# Patient Record
Sex: MALE | Race: WHITE | Employment: FULL TIME | URBAN - METROPOLITAN AREA
[De-identification: names, ages, dates, MRNs, and addresses within clinical notes are randomized per-mention and may not be internally consistent; named-entity substitution may affect disease eponyms.]

---

## 2020-07-13 ENCOUNTER — OFFICE VISIT (OUTPATIENT)
Dept: ORTHOPEDIC SURGERY | Age: 55
End: 2020-07-13
Payer: COMMERCIAL

## 2020-07-13 VITALS — BODY MASS INDEX: 29.78 KG/M2 | HEIGHT: 70 IN | WEIGHT: 208 LBS

## 2020-07-13 PROCEDURE — 99242 OFF/OP CONSLTJ NEW/EST SF 20: CPT | Performed by: ORTHOPAEDIC SURGERY

## 2020-07-13 RX ORDER — ATORVASTATIN CALCIUM 80 MG/1
80 TABLET, FILM COATED ORAL NIGHTLY
COMMUNITY
Start: 2019-10-04
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
## 2020-07-13 NOTE — LETTER
[address] [facility]   Surgery Precert & Billing Form:    DEMOGRAPHICS:                                                                                                       Patient Name:  Shala Jaquez  Patient :  1965   Patient SS#:      Patient Phone:  [phone] (home)  Alt. Patient Phone:    Patient Address:  [address] [id number]    PCP:   [facility]: Payor: Darius Abraham / Plan: [facility] - [place] PPO / Product Type: *No Product type* /     DIAGNOSIS & PROCEDURE:                                                                                      Diagnosis:   1. Traumatic complete tear of right rotator cuff, initial encounter    2.  Right shoulder injury, initial encounter      Operation: right    SURGERY  INFORMATION  Date of Surgery:   2020  Location:  [facility]   Type:    Outpatient  23 hour hold:  No  Surgeon:              Lottie Herndon MD      7/15/20     BILLING INFORMATION:                                                                                                Physician Procedure                                            CPT Codes        Right shoulder arthroscopy with rotator cuff repair and biceps tenodesis  65841, 51816                      PA, or Fellow Procedure                                      CPT Codes                     Precert information:

## 2020-07-13 NOTE — LETTER
PRE-SURGICAL PHYSICIAN ORDERS     Patient Name Leena Joseph                              1965                           Allergies Patient has no known allergies.                                                                               Pre-surgery Testing Orders:   []? Pre-surgical Anesthesia Orders Per Anesthesiologist         Preop Antibiotic Prophylaxis / DAY OF SURGERY      [x]? Cefazolin IVPB per weight base protocol  · Cefazolin 2 grams if <119.9 kg  · Cefazolin 3 grams if ?120kg (?264 lbs.)  · Pediatric(<12yo) Dosinmg/kg (maximum 2 grams)       If allergic to PCN/Cephalosporin, positive MRSA/history or ? 72  age (risk of C-difficile):         []? Vancomycin IVPB per weight base protocol  · Vancomycin 1 gm if < 80kg (<176 lbs.)  · Vancomycin 1.5 gm if ?80kg to <120kg (?176 to <264 lbs.)  · Vancomycin 2 gm if  if ?120kg (?264 lbs.)   ADDITIONAL MEDICATIONS:      [x]? Acetaminophen 500mg po 1 hour prior to procedure    []? EXPAREL 1.3%  20 cc  Subcutaneous     **DIONI AND RAMIREZ ONLY**  []? Ortho Irrigation: Bacitracin 50,999 units and Polymixin B 500,000 unites mixed in a syringe. OR to mix with 500 cc NS and use 200 cc for irrigation (FOR ALL PATIENTS WHO REQUIRED METAL IMPLANT OR GRAFT)   DVT PREVENTION:    Antithrombic wraps (Age 16 & older)  []? Thigh High  []? Knee high            []? Bilateral    []? Right     []? Left  [x]?   Knee high SHALOM Hose           Signature                                                 Date 7/15/20 2:45 PM                                              Coby Corral M.D                                                                                                 Scheduled By:  Kylee Dickerson 7/15/20   Direct Tel: [phone]                                        Direct Fax: [phone]

## 2020-07-13 NOTE — LETTER
[address] [place] [address]              [] [address] [facility]  Fax# [phone]                                                     [] Smita [facility]#[phone]                                      [x][address] [facility]#[phone]    Scheduled Date: 2020      Scheduled Time: 12pm      Time Needed: 60min     Patient Information:      Name: Rachael Lea      YOB: 1965      SSN:         Gender:  male                            Address: [address] [place] [address]   Phone Number: [phone] (home)     Insurance:  Payor: [facility] / Plan: Katrin Oreilly PPO / Product Type: *No Product type* /     Primary Care Physician:  Caio Armenta    H&P To Be Completed By: Dr. Ibrahim Lipoma Needed? [] Yes [x] No   Pacemaker/Cardiac Implant? [] Yes [x] No    Surgical Procedure: Right shoulder arthroscopy with rotator cuff repair and biceps tenodesis   CPT DACN:14018, 15112    Pre- Op Diagnosis:   1. Traumatic complete tear of right rotator cuff, initial encounter    2. Right shoulder injury, initial encounter      Diagnosis Code: U59.498V    Rep: Arthrex     Notified: [x] Yes [] No    Special Equipment: Proximal biceps kit, beach chair      [] Mini C-ARM   [] Large  C-ARM     Patient Status:   [x] Outpatient         [] Same Day Admission      [] In- Patient          []Day Admit    Anesthesia Type:    [x] General         [] MAC       [] IV Regional          [] Local          [x] ISB Block      Allergies:  No Known Allergies  Latex: [] Yes [x] No     Vitals:    20 1016   Weight: 208 lb (94.3 kg)   Height: 5' 10\" (1.778 m)                                                                  PRE-SURGICAL [facility]    Patient Name Rachael Lea     1965       Allergies Patient has no known allergies.             Pre-surgery Testing Orders:   [] Pre-surgical Anesthesia Orders Per Anesthesiologist Preop Antibiotic Prophylaxis / DAY OF SURGERY    [x] Cefazolin IVPB per weight base protocol  ? Cefazolin 2 grams if <119.9 kg  ? Cefazolin 3 grams if ?120kg (?264 lbs. )  ? Pediatric(<12yo) Dosinmg/kg (maximum 2 grams)     If allergic to PCN/Cephalosporin, positive MRSA/history or ? 72  age (risk of C-difficile):       [] Vancomycin IVPB per weight base protocol  ? Vancomycin 1 gm if < 80kg (<176 lbs. )  ? Vancomycin 1.5 gm if ?80kg to <120kg (?176 to <264 lbs. )  ? Vancomycin 2 gm if  if ?120kg (?264 lbs.)   ADDITIONAL MEDICATIONS:    [x] OFIRMEV IVPB 1gm over 15 minutes (Adjust to body weight when needed)       Administer in pre-op. []  EXPAREL 1.3%  20 cc  Subcutaneous    **[place]AST[place] AND RAMIREZ ONLY**  []  Ortho Irrigation: Bacitracin 50,999 units and Polymixin B 500,000 unites mixed in a syringe.  OR to mix with 500 cc NS and use 200 cc for irrigation (FOR ALL PATIENTS WHO REQUIRED METAL IMPLANT OR GRAFT)   DVT PREVENTION:  Antithrombic wraps (Age 16 & older)  [] Thigh High  []  Knee high            []Bilateral    [] Right     []  Left  [x]  Knee high SHALOM Hose           Signature                                                 Date 7/15/20 2:45 PM                                             Melissa Jeffery M.D                                                                                                 Scheduled By:  Chantelle Grant 7/15/20   Direct Tel: [phone]                                      Direct Fax: [phone]

## 2020-07-15 NOTE — PROGRESS NOTES
Moran Manifold    Age 53 y.o.    male    1965    MRN [id number]    7/23/2020  Arrival Time_____________  OR Time____________[address]     Procedure(s):  VIDEO ARTHROSCOPY RIGHT SHOULDER,  ROTATOR CUFF REPAIR, BICEPS TENODESIS -BLOCK-                      General    Surgeon(s):  Herbert Alarcon, MD       Phone [phone] ([place])     [facility]  Cell Work  _________________________________________________________________  _________________________________________________________________  _________________________________________________________________  _________________________________________________________________  _________________________________________________________________      PCP _____________________________ Phone_________________       H&P__________________Bringing    Chart            Epic  DOS     Called_______  EKG__________________Bringing    Chart            Epic  DOS     Called_______  LAB__________________ Bringing    Chart            Epic  DOS     Called_______  Cardiac Clearance_______Bringing    Chart            Epic      DOS       Called_______    Cardiologist________________________ Phone___________________________      ? Latter-day concerns / Waiver on Chart            PAT Communications_____________  ? Pre-op Instructions Given [place]          ______________________________  ? Directions to Surgery Center                          ______________________________  ? Transportation Home_______________      _______________________________  ?  Crutches/Walker__________________        _______________________________      ________Pre-op Orders   _______Transcribed    _______Wt.  ________Pharmacy          _______SCD  ______VTE     ______Beta Blocker  ________Consent             ________TED Loida Barbone

## 2020-07-16 NOTE — PROGRESS NOTES
Patient instructed to:  Bring picture ID, insurance card,proof of address  Dress in comfortable,loose clothing,no jewelry, no make up/or finger polish/nocontact lenses dos if appplicable  ALL Edgar Matt on operative limb must be removed prior to DOS -if applicable  Nothing to eat or drink after midnight the night before surgery   If instructed to take medicines day of surgery by PCP, take only with sips of water  If you are taking insulin or diabetic medications check with your PCP to adjust doses according to your NPO (not eating) status  Arrange for transportation to and from surgery  With a caregiver staying with you for 24 hours  --make sure you are bring appropriate clothes to fit over large operative drsg - if applicable  Bring copies of H&P and or ekg dos     If your are taking NSAIDS/ASA products/vitamins regularly confirm this with your surgeon regarding taking them preop 5 days before surgery     Notify your Surgeon if you develop any illness between now and surgery time; cough, cold, fever, sore throat, nausea, vomiting, etc.

## 2020-07-17 ENCOUNTER — OFFICE VISIT (OUTPATIENT)
Dept: PRIMARY CARE CLINIC | Age: 55
End: 2020-07-17
Payer: COMMERCIAL

## 2020-07-17 PROCEDURE — 99211 OFF/OP EST MAY X REQ PHY/QHP: CPT | Performed by: NURSE PRACTITIONER

## 2020-07-17 NOTE — PROGRESS NOTES
Missael Irizarry received a viral test for COVID-19. They were educated on isolation and quarantine as appropriate. For any symptoms, they were directed to seek care from their PCP, given contact information to establish with a doctor, directed to an urgent care or the emergency room.

## 2020-07-20 LAB — SARS-COV-2: NOT DETECTED

## 2020-07-21 ENCOUNTER — TELEPHONE (OUTPATIENT)
Dept: ORTHOPEDIC SURGERY | Age: 55
End: 2020-07-21

## 2020-07-21 NOTE — TELEPHONE ENCOUNTER
Auth: # [id number]    Date: 07/23/2020 - 10/21/2020  Type of SX:  OP  Location: [facility]  CPT: 11714 04384   DX Code: Q10.414D   Q09.91WK  SX area: RT shoulder arthroscopy with RCR  Insurance: Thuan Carias

## 2020-07-22 ENCOUNTER — ANESTHESIA EVENT (OUTPATIENT)
Dept: OPERATING ROOM | Age: 55
End: 2020-07-22
Payer: COMMERCIAL

## 2020-07-23 ENCOUNTER — ANESTHESIA (OUTPATIENT)
Dept: OPERATING ROOM | Age: 55
End: 2020-07-23
Payer: COMMERCIAL

## 2020-07-23 ENCOUNTER — HOSPITAL ENCOUNTER (OUTPATIENT)
Age: 55
Setting detail: OUTPATIENT SURGERY
Discharge: HOME | End: 2020-07-23
Attending: ORTHOPAEDIC SURGERY | Admitting: ORTHOPAEDIC SURGERY
Payer: COMMERCIAL

## 2020-07-23 VITALS
HEART RATE: 67 BPM | DIASTOLIC BLOOD PRESSURE: 75 MMHG | OXYGEN SATURATION: 94 % | BODY MASS INDEX: 29.78 KG/M2 | SYSTOLIC BLOOD PRESSURE: 122 MMHG | RESPIRATION RATE: 18 BRPM | HEIGHT: 70 IN | WEIGHT: 208 LBS | TEMPERATURE: 97 F

## 2020-07-23 VITALS
SYSTOLIC BLOOD PRESSURE: 117 MMHG | DIASTOLIC BLOOD PRESSURE: 79 MMHG | OXYGEN SATURATION: 98 % | RESPIRATION RATE: 2 BRPM

## 2020-07-23 PROCEDURE — 6360000002 HC RX W HCPCS: Performed by: NURSE ANESTHETIST, CERTIFIED REGISTERED

## 2020-07-23 PROCEDURE — 64415 NJX AA&/STRD BRCH PLXS IMG: CPT | Performed by: ANESTHESIOLOGY

## 2020-07-23 PROCEDURE — 3700000001 HC ADD 15 MINUTES (ANESTHESIA): Performed by: ORTHOPAEDIC SURGERY

## 2020-07-23 PROCEDURE — 3600000004 HC SURGERY LEVEL 4 BASE: Performed by: ORTHOPAEDIC SURGERY

## 2020-07-23 PROCEDURE — 7100000000 HC PACU RECOVERY - FIRST 15 MIN: Performed by: ORTHOPAEDIC SURGERY

## 2020-07-23 PROCEDURE — 2500000003 HC RX 250 WO HCPCS: Performed by: ANESTHESIOLOGY

## 2020-07-23 PROCEDURE — 3600000014 HC SURGERY LEVEL 4 ADDTL 15MIN: Performed by: ORTHOPAEDIC SURGERY

## 2020-07-23 PROCEDURE — 7100000010 HC PHASE II RECOVERY - FIRST 15 MIN: Performed by: ORTHOPAEDIC SURGERY

## 2020-07-23 PROCEDURE — 3700000000 HC ANESTHESIA ATTENDED CARE: Performed by: ORTHOPAEDIC SURGERY

## 2020-07-23 PROCEDURE — 2709999900 HC NON-CHARGEABLE SUPPLY: Performed by: ORTHOPAEDIC SURGERY

## 2020-07-23 PROCEDURE — 2580000003 HC RX 258: Performed by: ANESTHESIOLOGY

## 2020-07-23 PROCEDURE — 2720000010 HC SURG SUPPLY STERILE: Performed by: ORTHOPAEDIC SURGERY

## 2020-07-23 PROCEDURE — 6360000002 HC RX W HCPCS: Performed by: ANESTHESIOLOGY

## 2020-07-23 PROCEDURE — 2500000003 HC RX 250 WO HCPCS: Performed by: NURSE ANESTHETIST, CERTIFIED REGISTERED

## 2020-07-23 PROCEDURE — 6360000002 HC RX W HCPCS: Performed by: ORTHOPAEDIC SURGERY

## 2020-07-23 PROCEDURE — 6370000000 HC RX 637 (ALT 250 FOR IP): Performed by: ORTHOPAEDIC SURGERY

## 2020-07-23 PROCEDURE — 7100000011 HC PHASE II RECOVERY - ADDTL 15 MIN: Performed by: ORTHOPAEDIC SURGERY

## 2020-07-23 PROCEDURE — 6360000002 HC RX W HCPCS

## 2020-07-23 PROCEDURE — C1713 ANCHOR/SCREW BN/BN,TIS/BN: HCPCS | Performed by: ORTHOPAEDIC SURGERY

## 2020-07-23 PROCEDURE — 2580000003 HC RX 258: Performed by: ORTHOPAEDIC SURGERY

## 2020-07-23 DEVICE — SYSTEM IMPL INCL 2 4.75MM BIOCOMPOSITE SWIVELOCK C ANCHR: Type: IMPLANTABLE DEVICE | Site: SHOULDER | Status: FUNCTIONAL

## 2020-07-23 DEVICE — ANCHOR SUTURE BIOCOMP 4.75X19.1 MM SWIVELOCK C: Type: IMPLANTABLE DEVICE | Site: SHOULDER | Status: FUNCTIONAL

## 2020-07-23 RX ORDER — ROCURONIUM BROMIDE 10 MG/ML
INJECTION, SOLUTION INTRAVENOUS PRN
Status: DISCONTINUED | OUTPATIENT
Start: 2020-07-23 | End: 2020-07-23 | Stop reason: SDUPTHER

## 2020-07-23 RX ORDER — OMEPRAZOLE 20 MG/1
20 CAPSULE, DELAYED RELEASE ORAL DAILY
COMMUNITY

## 2020-07-23 RX ORDER — DIPHENHYDRAMINE HYDROCHLORIDE 50 MG/ML
12.5 INJECTION INTRAMUSCULAR; INTRAVENOUS
Status: DISCONTINUED | OUTPATIENT
Start: 2020-07-23 | End: 2020-07-23 | Stop reason: HOSPADM

## 2020-07-23 RX ORDER — SODIUM CHLORIDE 0.9 % (FLUSH) 0.9 %
10 SYRINGE (ML) INJECTION PRN
Status: DISCONTINUED | OUTPATIENT
Start: 2020-07-23 | End: 2020-07-23 | Stop reason: HOSPADM

## 2020-07-23 RX ORDER — ACETAMINOPHEN 500 MG
500 TABLET ORAL ONCE
Status: COMPLETED | OUTPATIENT
Start: 2020-07-23 | End: 2020-07-23

## 2020-07-23 RX ORDER — MORPHINE SULFATE 2 MG/ML
1 INJECTION, SOLUTION INTRAMUSCULAR; INTRAVENOUS EVERY 5 MIN PRN
Status: DISCONTINUED | OUTPATIENT
Start: 2020-07-23 | End: 2020-07-23 | Stop reason: HOSPADM

## 2020-07-23 RX ORDER — OXYCODONE HYDROCHLORIDE AND ACETAMINOPHEN 5; 325 MG/1; MG/1
1 TABLET ORAL EVERY 6 HOURS PRN
Qty: 28 TABLET | Refills: 0 | Status: SHIPPED | OUTPATIENT
Start: 2020-07-23 | End: 2020-07-30

## 2020-07-23 RX ORDER — MORPHINE SULFATE 2 MG/ML
2 INJECTION, SOLUTION INTRAMUSCULAR; INTRAVENOUS EVERY 5 MIN PRN
Status: DISCONTINUED | OUTPATIENT
Start: 2020-07-23 | End: 2020-07-23 | Stop reason: HOSPADM

## 2020-07-23 RX ORDER — HYDRALAZINE HYDROCHLORIDE 20 MG/ML
5 INJECTION INTRAMUSCULAR; INTRAVENOUS EVERY 10 MIN PRN
Status: DISCONTINUED | OUTPATIENT
Start: 2020-07-23 | End: 2020-07-23 | Stop reason: HOSPADM

## 2020-07-23 RX ORDER — PROMETHAZINE HYDROCHLORIDE 25 MG/ML
6.25 INJECTION, SOLUTION INTRAMUSCULAR; INTRAVENOUS
Status: DISCONTINUED | OUTPATIENT
Start: 2020-07-23 | End: 2020-07-23 | Stop reason: HOSPADM

## 2020-07-23 RX ORDER — MIDAZOLAM HYDROCHLORIDE 1 MG/ML
INJECTION INTRAMUSCULAR; INTRAVENOUS PRN
Status: DISCONTINUED | OUTPATIENT
Start: 2020-07-23 | End: 2020-07-23 | Stop reason: SDUPTHER

## 2020-07-23 RX ORDER — MEPERIDINE HYDROCHLORIDE 50 MG/ML
12.5 INJECTION INTRAMUSCULAR; INTRAVENOUS; SUBCUTANEOUS EVERY 5 MIN PRN
Status: DISCONTINUED | OUTPATIENT
Start: 2020-07-23 | End: 2020-07-23 | Stop reason: HOSPADM

## 2020-07-23 RX ORDER — SODIUM CHLORIDE, SODIUM LACTATE, POTASSIUM CHLORIDE, AND CALCIUM CHLORIDE .6; .31; .03; .02 G/100ML; G/100ML; G/100ML; G/100ML
IRRIGANT IRRIGATION PRN
Status: DISCONTINUED | OUTPATIENT
Start: 2020-07-23 | End: 2020-07-23 | Stop reason: ALTCHOICE

## 2020-07-23 RX ORDER — PROMETHAZINE HYDROCHLORIDE 25 MG/ML
INJECTION, SOLUTION INTRAMUSCULAR; INTRAVENOUS PRN
Status: DISCONTINUED | OUTPATIENT
Start: 2020-07-23 | End: 2020-07-23 | Stop reason: SDUPTHER

## 2020-07-23 RX ORDER — BUPIVACAINE HYDROCHLORIDE 5 MG/ML
INJECTION, SOLUTION EPIDURAL; INTRACAUDAL
Status: COMPLETED | OUTPATIENT
Start: 2020-07-23 | End: 2020-07-23

## 2020-07-23 RX ORDER — PROPOFOL 10 MG/ML
INJECTION, EMULSION INTRAVENOUS PRN
Status: DISCONTINUED | OUTPATIENT
Start: 2020-07-23 | End: 2020-07-23 | Stop reason: SDUPTHER

## 2020-07-23 RX ORDER — MAGNESIUM HYDROXIDE 1200 MG/15ML
LIQUID ORAL CONTINUOUS PRN
Status: COMPLETED | OUTPATIENT
Start: 2020-07-23 | End: 2020-07-23

## 2020-07-23 RX ORDER — OXYCODONE HYDROCHLORIDE AND ACETAMINOPHEN 5; 325 MG/1; MG/1
1 TABLET ORAL PRN
Status: DISCONTINUED | OUTPATIENT
Start: 2020-07-23 | End: 2020-07-23 | Stop reason: HOSPADM

## 2020-07-23 RX ORDER — OXYCODONE HYDROCHLORIDE AND ACETAMINOPHEN 5; 325 MG/1; MG/1
2 TABLET ORAL PRN
Status: DISCONTINUED | OUTPATIENT
Start: 2020-07-23 | End: 2020-07-23 | Stop reason: HOSPADM

## 2020-07-23 RX ORDER — DEXAMETHASONE SODIUM PHOSPHATE 10 MG/ML
INJECTION INTRAMUSCULAR; INTRAVENOUS PRN
Status: DISCONTINUED | OUTPATIENT
Start: 2020-07-23 | End: 2020-07-23 | Stop reason: SDUPTHER

## 2020-07-23 RX ORDER — FENTANYL CITRATE 50 UG/ML
INJECTION, SOLUTION INTRAMUSCULAR; INTRAVENOUS PRN
Status: DISCONTINUED | OUTPATIENT
Start: 2020-07-23 | End: 2020-07-23 | Stop reason: SDUPTHER

## 2020-07-23 RX ORDER — LIDOCAINE HYDROCHLORIDE 10 MG/ML
0.3 INJECTION, SOLUTION EPIDURAL; INFILTRATION; INTRACAUDAL; PERINEURAL
Status: DISCONTINUED | OUTPATIENT
Start: 2020-07-23 | End: 2020-07-23 | Stop reason: HOSPADM

## 2020-07-23 RX ORDER — MIDAZOLAM HYDROCHLORIDE 1 MG/ML
INJECTION INTRAMUSCULAR; INTRAVENOUS
Status: COMPLETED
Start: 2020-07-23 | End: 2020-07-23

## 2020-07-23 RX ORDER — LABETALOL HYDROCHLORIDE 5 MG/ML
5 INJECTION, SOLUTION INTRAVENOUS EVERY 10 MIN PRN
Status: DISCONTINUED | OUTPATIENT
Start: 2020-07-23 | End: 2020-07-23 | Stop reason: HOSPADM

## 2020-07-23 RX ORDER — SODIUM CHLORIDE 0.9 % (FLUSH) 0.9 %
10 SYRINGE (ML) INJECTION EVERY 12 HOURS SCHEDULED
Status: DISCONTINUED | OUTPATIENT
Start: 2020-07-23 | End: 2020-07-23 | Stop reason: HOSPADM

## 2020-07-23 RX ORDER — ONDANSETRON 2 MG/ML
4 INJECTION INTRAMUSCULAR; INTRAVENOUS PRN
Status: DISCONTINUED | OUTPATIENT
Start: 2020-07-23 | End: 2020-07-23 | Stop reason: HOSPADM

## 2020-07-23 RX ORDER — EPHEDRINE SULFATE 50 MG/ML
INJECTION INTRAVENOUS PRN
Status: DISCONTINUED | OUTPATIENT
Start: 2020-07-23 | End: 2020-07-23 | Stop reason: SDUPTHER

## 2020-07-23 RX ORDER — SODIUM CHLORIDE, SODIUM LACTATE, POTASSIUM CHLORIDE, CALCIUM CHLORIDE 600; 310; 30; 20 MG/100ML; MG/100ML; MG/100ML; MG/100ML
INJECTION, SOLUTION INTRAVENOUS CONTINUOUS
Status: DISCONTINUED | OUTPATIENT
Start: 2020-07-23 | End: 2020-07-23 | Stop reason: HOSPADM

## 2020-07-23 RX ORDER — LIDOCAINE HYDROCHLORIDE 20 MG/ML
INJECTION, SOLUTION INFILTRATION; PERINEURAL PRN
Status: DISCONTINUED | OUTPATIENT
Start: 2020-07-23 | End: 2020-07-23 | Stop reason: SDUPTHER

## 2020-07-23 RX ORDER — ONDANSETRON 2 MG/ML
INJECTION INTRAMUSCULAR; INTRAVENOUS PRN
Status: DISCONTINUED | OUTPATIENT
Start: 2020-07-23 | End: 2020-07-23 | Stop reason: SDUPTHER

## 2020-07-23 RX ADMIN — EPHEDRINE SULFATE 5 MG: 50 INJECTION INTRAVENOUS at 13:06

## 2020-07-23 RX ADMIN — CEFAZOLIN SODIUM 2 G: 10 INJECTION, POWDER, FOR SOLUTION INTRAVENOUS at 12:11

## 2020-07-23 RX ADMIN — EPHEDRINE SULFATE 5 MG: 50 INJECTION INTRAVENOUS at 12:56

## 2020-07-23 RX ADMIN — PROMETHAZINE HYDROCHLORIDE 2.5 MG: 25 INJECTION INTRAMUSCULAR; INTRAVENOUS at 13:24

## 2020-07-23 RX ADMIN — ONDANSETRON 4 MG: 2 INJECTION INTRAMUSCULAR; INTRAVENOUS at 12:28

## 2020-07-23 RX ADMIN — MIDAZOLAM HYDROCHLORIDE 2 MG: 2 INJECTION, SOLUTION INTRAMUSCULAR; INTRAVENOUS at 11:15

## 2020-07-23 RX ADMIN — ACETAMINOPHEN 500 MG: 500 TABLET ORAL at 10:36

## 2020-07-23 RX ADMIN — PROPOFOL 200 MG: 10 INJECTION, EMULSION INTRAVENOUS at 12:15

## 2020-07-23 RX ADMIN — ROCURONIUM BROMIDE 20 MG: 10 SOLUTION INTRAVENOUS at 13:00

## 2020-07-23 RX ADMIN — EPHEDRINE SULFATE 5 MG: 50 INJECTION INTRAVENOUS at 12:41

## 2020-07-23 RX ADMIN — DEXAMETHASONE SODIUM PHOSPHATE 10 MG: 10 INJECTION INTRAMUSCULAR; INTRAVENOUS at 12:28

## 2020-07-23 RX ADMIN — FENTANYL CITRATE 50 MCG: 50 INJECTION INTRAMUSCULAR; INTRAVENOUS at 12:30

## 2020-07-23 RX ADMIN — SODIUM CHLORIDE, POTASSIUM CHLORIDE, SODIUM LACTATE AND CALCIUM CHLORIDE: 600; 310; 30; 20 INJECTION, SOLUTION INTRAVENOUS at 13:02

## 2020-07-23 RX ADMIN — BUPIVACAINE HYDROCHLORIDE 30 ML: 5 INJECTION, SOLUTION EPIDURAL; INTRACAUDAL; PERINEURAL at 11:29

## 2020-07-23 RX ADMIN — ROCURONIUM BROMIDE 50 MG: 10 SOLUTION INTRAVENOUS at 12:15

## 2020-07-23 RX ADMIN — LIDOCAINE HYDROCHLORIDE 80 MG: 20 INJECTION, SOLUTION INFILTRATION; PERINEURAL at 12:15

## 2020-07-23 RX ADMIN — SUGAMMADEX 200 MG: 100 INJECTION, SOLUTION INTRAVENOUS at 13:24

## 2020-07-23 RX ADMIN — SODIUM CHLORIDE, POTASSIUM CHLORIDE, SODIUM LACTATE AND CALCIUM CHLORIDE: 600; 310; 30; 20 INJECTION, SOLUTION INTRAVENOUS at 12:13

## 2020-07-23 RX ADMIN — SODIUM CHLORIDE, POTASSIUM CHLORIDE, SODIUM LACTATE AND CALCIUM CHLORIDE: 600; 310; 30; 20 INJECTION, SOLUTION INTRAVENOUS at 10:54

## 2020-07-23 ASSESSMENT — PULMONARY FUNCTION TESTS
PIF_VALUE: 22
PIF_VALUE: 22
PIF_VALUE: 1
PIF_VALUE: 2
PIF_VALUE: 21
PIF_VALUE: 20
PIF_VALUE: 2
PIF_VALUE: 2
PIF_VALUE: 8
PIF_VALUE: 27
PIF_VALUE: 21
PIF_VALUE: 22
PIF_VALUE: 21
PIF_VALUE: 20
PIF_VALUE: 22
PIF_VALUE: 22
PIF_VALUE: 16
PIF_VALUE: 21
PIF_VALUE: 21
PIF_VALUE: 20
PIF_VALUE: 21
PIF_VALUE: 17
PIF_VALUE: 22
PIF_VALUE: 21
PIF_VALUE: 21
PIF_VALUE: 1
PIF_VALUE: 21
PIF_VALUE: 21
PIF_VALUE: 15
PIF_VALUE: 21
PIF_VALUE: 5
PIF_VALUE: 20
PIF_VALUE: 1
PIF_VALUE: 21
PIF_VALUE: 16
PIF_VALUE: 21
PIF_VALUE: 20
PIF_VALUE: 3
PIF_VALUE: 15
PIF_VALUE: 20
PIF_VALUE: 22
PIF_VALUE: 3
PIF_VALUE: 19
PIF_VALUE: 22
PIF_VALUE: 21
PIF_VALUE: 20
PIF_VALUE: 19
PIF_VALUE: 21
PIF_VALUE: 16
PIF_VALUE: 20
PIF_VALUE: 20
PIF_VALUE: 21
PIF_VALUE: 20
PIF_VALUE: 1
PIF_VALUE: 22
PIF_VALUE: 21
PIF_VALUE: 22
PIF_VALUE: 17
PIF_VALUE: 12
PIF_VALUE: 21
PIF_VALUE: 5
PIF_VALUE: 22
PIF_VALUE: 21
PIF_VALUE: 16
PIF_VALUE: 20
PIF_VALUE: 0
PIF_VALUE: 21
PIF_VALUE: 21
PIF_VALUE: 22
PIF_VALUE: 21
PIF_VALUE: 22
PIF_VALUE: 20
PIF_VALUE: 21
PIF_VALUE: 2
PIF_VALUE: 20
PIF_VALUE: 22
PIF_VALUE: 16
PIF_VALUE: 21
PIF_VALUE: 22

## 2020-07-23 ASSESSMENT — PAIN SCALES - GENERAL
PAINLEVEL_OUTOF10: 4
PAINLEVEL_OUTOF10: 0

## 2020-07-23 ASSESSMENT — PAIN - FUNCTIONAL ASSESSMENT: PAIN_FUNCTIONAL_ASSESSMENT: 0-10

## 2020-07-23 NOTE — ANESTHESIA POSTPROCEDURE EVALUATION
Department of Anesthesiology  Postprocedure Note    Patient: Jenni Menendez  MRN: [id number]  YOB: 1965  Date of evaluation: 7/23/2020  Time:  7:19 PM     Procedure Summary     Date:  07/23/20 Room / Location:  [address] [place] [address]    Anesthesia Start:  7262 Anesthesia Stop:  8193    Procedure:  VIDEO ARTHROSCOPY RIGHT SHOULDER,  ROTATOR CUFF REPAIR, BICEPS TENODESIS -BLOCK- (Right Shoulder) Diagnosis:       Traumatic rotator cuff tear, right, initial encounter      (Traumatic rotator cuff tear, right, initial encounter [S46.011A])    Surgeon:  Candice Don MD Responsible Provider:  Alek Zhu MD    Anesthesia Type:  general ASA Status:  2          Anesthesia Type: general    Humphrey Phase I: Humphrey Score: 8    Humphrey Phase II: Humphrey Score: 9    Last vitals: Reviewed and per EMR flowsheets.        Anesthesia Post Evaluation    Patient location during evaluation: PACU  Level of consciousness: awake  Airway patency: patent  Nausea & Vomiting: no nausea  Complications: no  Cardiovascular status: blood pressure returned to baseline  Respiratory status: acceptable  Hydration status: euvolemic

## 2020-07-23 NOTE — H&P
Department of Orthopedic Surgery  Attending   History and Physical        CHIEF COMPLAINT:  RCT    Reason for Admission: As Above    History Obtained From:  patient    HISTORY OF PRESENT ILLNESS:      The patient is a 53 y.o. male  who presents with trauma. No recent [facility] health       Past Medical History:    History reviewed. No pertinent past medical history. Past Surgical History:        Procedure Laterality Date    ANTERIOR CRUCIATE LIGAMENT REPAIR  02/2020         Medications Prior to Admission:   Prior to Admission medications    Medication Sig Start Date End Date Taking? Authorizing Provider   omeprazole (PRILOSEC) 20 MG delayed release capsule Take 20 mg by mouth daily   Yes Historical Provider, MD   atorvastatin (LIPITOR) 80 MG tablet Take 80 mg by mouth nightly 10/4/19  Yes Historical Provider, MD       Allergies:  Patient has no known allergies. Social History:    Tobacco:  reports that he has never smoked. He has never used smokeless tobacco.   Alcohol:  reports current alcohol use. Illicit Drug: No  Family History:       Problem Relation Age of Onset    Heart Attack Mother         lates 66's    Heart Attack Father         late 52's     REVIEW OF SYSTEMS:  CONSTITUTIONAL:  negative  MUSCULOSKELETAL:  positive for  pain    PHYSICAL EXAM:  Admission weight: 208 lb (94.3 kg)  5' 10\" (177.8 cm)  VITALS:  /84   Pulse 73   Temp 97 °F (36.1 °C) (Temporal)   Resp 16   Ht 5' 10\" (1.778 m)   Wt 208 lb (94.3 kg)   SpO2 97%   BMI 29.84 kg/m²     CONSTITUTIONAL:  awake, alert, cooperative, no apparent distress, and appears stated age  Cardiac; RRR  Pulm; CTA b  MUSCULOSKELETAL:  There is no redness, warmth, or swelling of the joints. Full range of motion noted. Motor strength is 5 out of 5 all extremities bilaterally.   Tone is normal.    DATA:  CBC: No results found for: WBC, RBC, HGB, HCT, MCV, MCH, MCHC, RDW, PLT, MPV  BMP:    Lab Results   Component Value Date    LABALBU 4.3 07/01/2016     PT/INR:  No results found for: PROTIME, INR    Radiology:  No orders to display         ASSESSMENT: Plan to proceed    PLAN:  1)  Std birgit-op care  2)  consent  3)  Anticipate d/c      Mamadou Yung

## 2020-07-23 NOTE — BRIEF OP NOTE
Brief Postoperative Note      Patient: Deanne Davidson  YOB: 1965  MRN: [id number]    Date of Procedure: 7/23/2020    Pre-Op Diagnosis: Traumatic rotator cuff tear, right, initial encounter [S46.011A]    Post-Op Diagnosis: Same       Procedure(s):  VIDEO ARTHROSCOPY RIGHT SHOULDER,  ROTATOR CUFF REPAIR, BICEPS TENODESIS -BLOCK-    Surgeon(s):  Mamadou Yung MD    Assistant:  Surgical Assistant: Delfino Abernathy    Anesthesia: General    Estimated Blood Loss (mL): less than 50     Complications: None    Specimens:   * No specimens in log *    Implants:  Implant Name Type Inv.  Item Serial No.  Lot No. LRB No. Used Action   ANCHOR SUTURE SWIVELOCK 4.75X19.1 BIOCOMPOSITE MIN 5EA Fastener ANCHOR SUTURE SWIVELOCK 4.75X19.1 BIOCOMPOSITE MIN 5EA  [employer] [id number] Right 1 Implanted   IMPL SYS W/BIOCOMP [facility] SPEEDBRDG Fastener IMPL SYS W/BIOCOMP [facility] [employer] [id number] Right 1 Implanted         Drains: * No LDAs found *    Findings: Please see operative note    Electronically signed by Mamadou Yung MD on 7/23/2020 at 5:52 PM

## 2020-07-23 NOTE — ANESTHESIA PROCEDURE NOTES
Peripheral Block    Patient location during procedure: pre-op  Start time: 7/23/2020 11:19 AM  End time: 7/23/2020 11:24 AM  Staffing  Anesthesiologist: Parvin Blandon MD  Performed: anesthesiologist   Preanesthetic Checklist  Completed: patient identified, site marked, surgical consent, pre-op evaluation, timeout performed, IV checked, risks and benefits discussed, monitors and equipment checked, anesthesia consent given, oxygen available and patient being monitored  Peripheral Block  Patient position: sitting  Prep: ChloraPrep  Patient monitoring: cardiac monitor, continuous pulse ox, frequent blood pressure checks and IV access  Block type: Brachial plexus  Laterality: right  Injection technique: single-shot  Procedures: ultrasound guided  Local infiltration: lidocaine  Infiltration strength: 1 %  Dose: 3 mL  Interscalene  Provider prep: mask and sterile gloves  Local infiltration: lidocaine  Needle  Needle gauge: 21 G  Needle length: 10 cm  Needle localization: ultrasound guidance  Assessment  Injection assessment: negative aspiration for heme, no paresthesia on injection and local visualized surrounding nerve on ultrasound  Paresthesia pain: none  Slow fractionated injection: yes  Hemodynamics: stable  Additional Notes  Immediately prior to procedure a \"time out\" was called to verify the correct patient, allergies, laterality, procedure and equipment. Time out performed with RN; epi in block    Anterior scalene and middle scalene muscles, upper, middle and lower trunks of the brachial plexus are identified, the tip of the needle and the spread of the local anesthetic around the brachial plexus are visualized. The Brachial plexus appeared to be anatomically normal and there were no abnormal pathologically findings seen.          Medications Administered  Bupivacaine (MARCAINE) PF injection 0.5%, 30 mL  Reason for block: post-op pain management and at surgeon's request

## 2020-07-23 NOTE — ANESTHESIA PRE PROCEDURE
Department of Anesthesiology  Preprocedure Note       Name:  Roberto Whaley   Age:  53 y.o.  :  1965                                          MRN:  [id number]         Date:  2020      Surgeon: Cyndi Hernandes):  Patti Rolon MD    Procedure: Procedure(s):  VIDEO ARTHROSCOPY RIGHT SHOULDER,  ROTATOR CUFF REPAIR, BICEPS TENODESIS -BLOCK-    Medications prior to admission:   Prior to Admission medications    Medication Sig Start Date End Date Taking? Authorizing Provider   omeprazole (PRILOSEC) 20 MG delayed release capsule Take 20 mg by mouth daily   Yes Historical Provider, MD   atorvastatin (LIPITOR) 80 MG tablet Take 80 mg by mouth nightly 10/4/19  Yes Historical Provider, MD       Current medications:    Current Facility-Administered Medications   Medication Dose Route Frequency Provider Last Rate Last Dose    ceFAZolin (ANCEF) 2 g in dextrose 5 % 100 mL IVPB  2 g Intravenous On Call to Nicole Bales MD        lactated ringers infusion   Intravenous Continuous Leonel Lerma  mL/hr at 20 1054      sodium chloride flush 0.9 % injection 10 mL  10 mL Intravenous 2 times per day Leonel Lerma MD        sodium chloride flush 0.9 % injection 10 mL  10 mL Intravenous PRN Leonel Lerma MD        lidocaine PF 1 % injection 0.3 mL  0.3 mL Intradermal Once PRN Leonel Lerma MD        midazolam (VERSED) 2 MG/2ML injection                Allergies:  No Known Allergies    Problem List:  There is no problem list on file for this patient. Past Medical History:  History reviewed. No pertinent past medical history.     Past Surgical History:        Procedure Laterality Date    ANTERIOR CRUCIATE LIGAMENT REPAIR  2020       Social History:    Social History     Tobacco Use    Smoking status: Never Smoker    Smokeless tobacco: Never Used   Substance Use Topics    Alcohol use: Yes     Comment: 6 drinks                                 Counseling given: Not Answered      Vital Signs (Current): Vitals:    07/16/20 1529 07/23/20 1028   BP:  130/84   Pulse:  73   Resp:  16   Temp:  97 °F (36.1 °C)   TempSrc:  Temporal   SpO2:  97%   Weight: 208 lb (94.3 kg)    Height: 5' 10\" (1.778 m)                                               BP Readings from Last 3 Encounters:   07/23/20 130/84       NPO Status: Time of last liquid consumption: 1900                        Time of last solid consumption: 1900                        Date of last liquid consumption: 07/22/20                        Date of last solid food consumption: 07/22/20    BMI:   Wt Readings from Last 3 Encounters:   07/16/20 208 lb (94.3 kg)   07/13/20 208 lb (94.3 kg)     Body mass index is 29.84 kg/m². CBC: No results found for: WBC, RBC, HGB, HCT, MCV, RDW, PLT    CMP:   Lab Results   Component Value Date    PROT 7.0 07/01/2016    BILITOT 0.4 07/01/2016    ALKPHOS 50 07/01/2016    AST 23 07/01/2016    ALT 34 07/01/2016       POC Tests: No results for input(s): POCGLU, POCNA, POCK, POCCL, POCBUN, POCHEMO, POCHCT in the last 72 hours.     Coags: No results found for: PROTIME, INR, APTT    HCG (If Applicable): No results found for: PREGTESTUR, PREGSERUM, HCG, HCGQUANT     ABGs: No results found for: PHART, PO2ART, PQD4LST, YCU8OII, BEART, K0PTJNQA     Type & Screen (If Applicable):  No results found for: LABABO, LABRH    Drug/Infectious Status (If Applicable):  No results found for: HIV, HEPCAB    COVID-19 Screening (If Applicable):   Lab Results   Component Value Date    COVID19 NOT DETECTED 07/17/2020         Anesthesia Evaluation  Patient summary reviewed and Nursing notes reviewed  Airway: Mallampati: II  TM distance: >3 FB   Neck ROM: full  Mouth opening: > = 3 FB Dental: normal exam         Pulmonary:Negative Pulmonary ROS and normal exam  breath sounds clear to auscultation                             Cardiovascular:Negative CV ROS            Rhythm: regular  Rate: normal                    Neuro/Psych:   Negative Neuro/Psych ROS GI/Hepatic/Renal: Neg GI/Hepatic/Renal ROS            Endo/Other: Negative Endo/Other ROS                    Abdominal:   (+) obese,         Vascular: negative vascular ROS. Anesthesia Plan      general     ASA 2     (Block)  Induction: intravenous. MIPS: Postoperative opioids intended and Prophylactic antiemetics administered. Anesthetic plan and risks discussed with patient. Plan discussed with CRNA.                   Melissa Connell MD   7/23/2020
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
## 2020-08-04 ENCOUNTER — HOSPITAL ENCOUNTER (OUTPATIENT)
Dept: PHYSICAL THERAPY | Age: 55
Setting detail: THERAPIES SERIES
Discharge: HOME | End: 2020-08-04
Payer: COMMERCIAL

## 2020-08-04 ENCOUNTER — OFFICE VISIT (OUTPATIENT)
Dept: ORTHOPEDIC SURGERY | Age: 55
End: 2020-08-04

## 2020-08-04 PROCEDURE — 97161 PT EVAL LOW COMPLEX 20 MIN: CPT | Performed by: PHYSICAL THERAPIST

## 2020-08-04 PROCEDURE — 97110 THERAPEUTIC EXERCISES: CPT | Performed by: PHYSICAL THERAPIST

## 2020-08-04 PROCEDURE — 99024 POSTOP FOLLOW-UP VISIT: CPT | Performed by: ORTHOPAEDIC SURGERY

## 2020-08-04 PROCEDURE — 97140 MANUAL THERAPY 1/> REGIONS: CPT | Performed by: PHYSICAL THERAPIST

## 2020-08-04 RX ORDER — FLUTICASONE FUROATE 100 UG/1
POWDER RESPIRATORY (INHALATION)
COMMUNITY
Start: 2020-07-20

## 2020-08-04 RX ORDER — TIZANIDINE 2 MG/1
TABLET ORAL
COMMUNITY
Start: 2020-07-06

## 2020-08-04 RX ORDER — NAPROXEN SODIUM 550 MG/1
TABLET ORAL
COMMUNITY
Start: 2020-07-06
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
## 2020-08-04 NOTE — PROGRESS NOTES
Surgery: Traumatic large anterior superior rotator cuff tear    Post-Op Week:  2    Chief Complaint:  Post-Op Check (1ST PO CK RT RTC REPAIR 7/22)      History of Present of Illness: Chad David is seen today in follow-up doing reasonably well. He has an appropriate amount of soreness and disruption of sleep. He has had no complications. Review of Systems  Pertinent items are noted in HPI  Denies fever, chills, confusion, bowel/bladder active change. Review of systems reviewed from Patient History Form dated on August 4 and available in the patient's chart under the Media tab. Examination:  On exam today he has a normal contour of his biceps. Some ecchymosis in the subpectoral region. Has minimal subdeltoid swelling. Portals healing well. Radiology:     None    Orders Placed This Encounter   Procedures    [facility] PT [facility] Physical Therapy     Referral Priority:   Routine     Referral Type:   Eval and Treat     Referral Reason:   Specialty Services Required     Requested Specialty:   Physical Therapy     Number of Visits Requested:   1       Impression:  No evident complications      Treatment Plan: We had a nice visit today regarding the findings of his surgical procedure as well as the recovery process. He has his first physical therapy today and he will initiate only some very gentle pendulums, scapular retractions and scapulothoracic mobility and active movement of the elbow and hand. He had a significant a large tear of the upper third subscapularis as well as some delamination and traumatic tendon loss of the supraspinatus. However he does have a good prognostic factors for healing and was a traumatic injury repaired quickly. We have spoken with physical therapy. I outlined appropriate use of the sling. We will see him back in roughly 3 weeks.           [address] [facility] and [facility] Medicine Surgery     This dictation was performed with a verbal recognition program (DRAGON) and it was checked for errors. It is possible that there are still dictated errors within this office note. If so, please bring any errors to my attention for an addendum. All efforts were made to ensure that this office note is accurate.

## 2020-08-04 NOTE — FLOWSHEET NOTE
hep   SBS :05 X 10 hep   Passive elbow flex  X 10 hep   Active wrist flex/ ext  X 10 hep   pendulum  X 10 hep   Cervical stretches (lev and UT) :20 3x hep                     Extensive education on surgery  And  precautions Handouts provided. Manual Intervention (31625)      Joint oscillation  4 min    Passive elbow flex/ ext  2 min    Passive ER to neutral  2 min                      NMR re-education (54382)   CUES NEEDED                                   Therapeutic Exercise and NMR EXR  [x] (90846) Provided verbal/tactile cueing for activities related to strengthening, flexibility, endurance, ROM  for improvements in scapular, scapulothoracic and UE control with self care, reaching, carrying, lifting, house/yardwork, driving/computer work.    [] (98938) Provided verbal/tactile cueing for activities related to improving balance, coordination, kinesthetic sense, posture, motor skill, proprioception  to assist with  scapular, scapulothoracic and UE control with self care, reaching, carrying, lifting, house/yardwork, driving/computer work. Therapeutic Activities:    [] (40458 or 81362) Provided verbal/tactile cueing for activities related to improving balance, coordination, kinesthetic sense, posture, motor skill, proprioception and motor activation to allow for proper function of scapular, scapulothoracic and UE control with self care, carrying, lifting, driving/computer work.      Home Exercise Program:    [x] (78629) Reviewed/Progressed HEP activities related to strengthening, flexibility, endurance, ROM of scapular, scapulothoracic and UE control with self care, reaching, carrying, lifting, house/yardwork, driving/computer work  [] (64012) Reviewed/Progressed HEP activities related to improving balance, coordination, kinesthetic sense, posture, motor skill, proprioception of scapular, scapulothoracic and UE control with self care, reaching, carrying, lifting, house/yardwork, driving/computer work Manual Treatments:  PROM / STM / Oscillations-Mobs:  G-I, II, III, IV (PA's, Inf., Post.)  [x] (04275) Provided manual therapy to mobilize soft tissue/joints of cervical/CT, scapular GHJ and UE for the purpose of modulating pain, promoting relaxation,  increasing ROM, reducing/eliminating soft tissue swelling/inflammation/restriction, improving soft tissue extensibility and allowing for proper ROM for normal function with self care, reaching, carrying, lifting, house/yardwork, driving/computer work    Modalities:      Charges:  Timed Code Treatment Minutes: 30   Total Treatment Minutes: 45       [x] EVAL (LOW) 86636 (typically 20 minutes face-to-face)  [] EVAL (MOD) 30731 (typically 30 minutes face-to-face)  [] EVAL (HIGH) 08093 (typically 45 minutes face-to-face)  [] RE-EVAL     [x] YR(13041) x   1  [] IONTO  [] NMR (52718) x     [] VASO  [x] Manual (36728) x   1   [] Other:  [] TA x      [] Mech Traction (09141)  [] ES(attended) (80181)      [] ES (un) (55731):     GOALS:  Patient stated goal: return to full function  []? Progressing: []? Met: []? Not Met: []? Adjusted     Therapist goals for Patient:   Short Term Goals: To be achieved in: 2 weeks  1. Independent in HEP and progression per patient tolerance, in order to prevent re-injury. []? Progressing: []? Met: []? Not Met: []? Adjusted  2. Patient will have a decrease in pain to facilitate improvement in movement, function, and ADLs as indicated by Functional Deficits. []? Progressing: []? Met: []? Not Met: []? Adjusted     Long Term Goals: To be achieved in: 8 weeks  1. Disability index score of 30% or less for the [facility] to assist with reaching prior level of function. []? Progressing: []? Met: []? Not Met: []? Adjusted  2. Patient will demonstrate increased AROM to 150 flex, 90 ER, and 70 IR to allow for proper joint functioning as indicated by patients Functional Deficits. []? Progressing: []? Met: []? Not Met: []? Adjusted  3.  Patient will demonstrate an increase in Strength to 4+/5 throughout the right UE to allow for proper functional mobility as indicated by patients Functional Deficits. []? Progressing: []? Met: []? Not Met: []? Adjusted  4. Patient will return to dressing, driving, combing hair with right hand without increased symptoms or restriction. []? Progressing: []? Met: []? Not Met: []? Adjusted  5. Participate in [facility] for return to sport. []? Progressing: []? Met: []? Not Met: []? Adjusted          Progression Towards Functional goals:  [] Patient is progressing as expected towards functional goals listed. [] Progression is slowed due to complexities listed. [] Progression has been slowed due to co-morbidities. [x] Plan just implemented, too soon to assess goals progression  [] Other:     ASSESSMENT:  See eval    Overall Progression Towards Functional goals/ Treatment Progress Update:  [] Patient is progressing as expected towards functional goals listed. [] Progression is slowed due to complexities/Impairments listed. [] Progression has been slowed due to co-morbidities.   [x] Plan just implemented, too soon to assess goals progression <30days   [] Goals require adjustment due to lack of progress  [] Patient is not progressing as expected and requires additional follow up with physician  [] Other    Prognosis for POC: [x] Good [] Fair  [] Poor      Patient requires continued skilled intervention: [x] Yes  [] No    Treatment/Activity Tolerance:  [x] Patient able to complete treatment  [] Patient limited by fatigue  [] Patient limited by pain    [] Patient limited by other medical complications  [] Other:         Return to Play: (if applicable)   []  Stage 1: Intro to Strength   []  Stage 2: Return to Run and Strength   []  Stage 3: Return to Jump and Strength   []  Stage 4: Dynamic Strength and Agility   []  Stage 5: Sport Specific Training     []  Ready to Return to Play, Meets All Above Stages   []  Not Ready for Return to Sports   Comments:                               PLAN: See eval  [] Continue per plan of care [] Alter current plan (see comments above)  [x] Plan of care initiated [] Hold pending MD visit [] Discharge      Electronically signed by:  Amparo Trinidad PT    Note: If patient does not return for scheduled/ recommended follow up visits, this note will serve as a discharge from care along with most recent update on progress.

## 2020-08-18 ENCOUNTER — HOSPITAL ENCOUNTER (OUTPATIENT)
Dept: PHYSICAL THERAPY | Age: 55
Setting detail: THERAPIES SERIES
Discharge: HOME | End: 2020-08-18
Payer: COMMERCIAL

## 2020-08-18 PROCEDURE — 97140 MANUAL THERAPY 1/> REGIONS: CPT | Performed by: PHYSICAL THERAPIST

## 2020-08-18 PROCEDURE — 97110 THERAPEUTIC EXERCISES: CPT | Performed by: PHYSICAL THERAPIST

## 2020-08-18 NOTE — FLOWSHEET NOTE
[facility],  [address]  Phone: [phone]  Fax [phone]        Date:  2020    Patient Name:  Jessica Alvarado    :  1965  MRN: [id number]  Restrictions/Precautions:    Medical/Treatment Diagnosis Information:  · Diagnosis: S46.011D  traumatic RTC tear    s/p Double row supraspinatus repair, subscap repair, biceps tenodesis, labral debridement    DOS:  20  · Treatment Diagnosis: right shoulder pain S20.349  Insurance/Certification information:  PT Insurance Information: BCBS- $2500 deductible met  0 copay, 20 PT   (used 10)  Needs auth  Physician Information:  Referring Practitioner: Dr. Elizabeth Butt  Has the plan of care been signed (Y/N):        []  Yes  [x]  No     Date of Patient follow up with Physician: 20      Is this a Progress Report:     []  Yes  [x]  No        If Yes:  Date Range for reporting period:  Beginnin20  Ending:     Progress report will be due (10 Rx or 30 days whichever is less):        Recertification will be due (POC Duration  / 90 days whichever is less):        Visit # Insurance Allowable Auth Required   2 10 needs auth []  Yes []  No        Therapy Diagnosis/Practice Pattern:I      Number of Comorbidities:  [x]0     []1-2    []3+    Latex Allergy:  [x]NO      []YES  Preferred Language for Healthcare:   [x]English       []other:    SUBJECTIVE:  Pt is 3.5 weeks post op. Pt has been sleeping in a lazy boy. He was jolted awake the other night, and he is more sore in his upper trap. No c/o N/T. Pt is taking advil to control symptoms.       OBJECTIVE: See eval   Observation:    Test measurements:    ROM Left Right   Shoulder Flex  85   Shoulder Abd     Shoulder ER  10   Shoulder IR     Elbow flex  WNL   Elbow ext  WNL   Strength  Left Right   Shoulder Flex  nt   Shoulder Scap  nt   Shoulder ER  nt   Shoulder IR  nt        Pain scale  2-3  /10   Quick dash skill, proprioception of scapular, scapulothoracic and UE control with self care, reaching, carrying, lifting, house/yardwork, driving/computer work      Manual Treatments:  PROM / STM / Oscillations-Mobs:  G-I, II, III, IV (Jorge L, Inf., Post.)  [x] (01280) Provided manual therapy to mobilize soft tissue/joints of cervical/CT, scapular GHJ and UE for the purpose of modulating pain, promoting relaxation,  increasing ROM, reducing/eliminating soft tissue swelling/inflammation/restriction, improving soft tissue extensibility and allowing for proper ROM for normal function with self care, reaching, carrying, lifting, house/yardwork, driving/computer work    Modalities:      Charges:  Timed Code Treatment Minutes: 45   Total Treatment Minutes: 45       [] EVAL (LOW) 33801 (typically 20 minutes face-to-face)  [] EVAL (MOD) 15514 (typically 30 minutes face-to-face)  [] EVAL (HIGH) 08699 (typically 45 minutes face-to-face)  [] RE-EVAL     [x] EA(89073) x   2  [] IONTO  [] NMR (01581) x     [] VASO  [x] Manual (70569) x   1   [] Other:  [] TA x      [] Mech Traction (00606)  [] ES(attended) (14945)      [] ES (un) (39326):     GOALS:  Patient stated goal: return to full function  []? Progressing: []? Met: []? Not Met: []? Adjusted     Therapist goals for Patient:   Short Term Goals: To be achieved in: 2 weeks  1. Independent in HEP and progression per patient tolerance, in order to prevent re-injury. []? Progressing: []? Met: []? Not Met: []? Adjusted  2. Patient will have a decrease in pain to facilitate improvement in movement, function, and ADLs as indicated by Functional Deficits. []? Progressing: []? Met: []? Not Met: []? Adjusted     Long Term Goals: To be achieved in: 8 weeks  1. Disability index score of 30% or less for the [facility] to assist with reaching prior level of function. []? Progressing: []? Met: []? Not Met: []? Adjusted  2.  Patient will demonstrate increased AROM to 150 flex, 90 ER, and 70 IR to Strength   []  Stage 2: Return to Run and Strength   []  Stage 3: Return to Jump and Strength   []  Stage 4: Dynamic Strength and Agility   []  Stage 5: Sport Specific Training     []  Ready to Return to Play, Meets All Above Stages   []  Not Ready for Return to Sports   Comments:                               PLAN: See eval  [x] Continue per plan of care [] Alter current plan (see comments above)  [] Plan of care initiated [] Hold pending MD visit [] Discharge      Electronically signed by:  Chelsea Santos PT    Note: If patient does not return for scheduled/ recommended follow up visits, this note will serve as a discharge from care along with most recent update on progress.

## 2020-08-25 ENCOUNTER — OFFICE VISIT (OUTPATIENT)
Dept: ORTHOPEDIC SURGERY | Age: 55
End: 2020-08-25

## 2020-08-25 VITALS — WEIGHT: 208 LBS | HEIGHT: 70 IN | BODY MASS INDEX: 29.78 KG/M2

## 2020-08-25 PROCEDURE — 99024 POSTOP FOLLOW-UP VISIT: CPT | Performed by: ORTHOPAEDIC SURGERY

## 2020-08-25 RX ORDER — IBUPROFEN 600 MG/1
600 TABLET ORAL EVERY 6 HOURS PRN
COMMUNITY

## 2020-08-26 NOTE — PROGRESS NOTES
Surgery:  RCR and BTD    Post-Op Week:  4.5    Chief Complaint:  Follow-up (RIGHT SHOULDER)      History of Present of Illness: doing well, moderate pain    Review of Systems  Pertinent items are noted in HPI  Denies fever, chills, confusion, bowel/bladder active change. Review of systems reviewed from Patient History Form dated on 8/25 and available in the patient's chart under the Media tab. Examination:  Portals benign, nml biceps contour, DNVI    Radiology:     none    No orders of the defined types were placed in this encounter. Impression:  Doing well      Treatment Plan:  Advance PT gradually into AAROM - AROM, no reisstance until 8 weeks. Will u/s eval if needed          [address] [facility]     This dictation was performed with a verbal recognition program (DRAGON) and it was checked for errors. It is possible that there are still dictated errors within this office note. If so, please bring any errors to my attention for an addendum. All efforts were made to ensure that this office note is accurate.

## 2020-09-01 ENCOUNTER — HOSPITAL ENCOUNTER (OUTPATIENT)
Dept: PHYSICAL THERAPY | Age: 55
Setting detail: THERAPIES SERIES
Discharge: HOME | End: 2020-09-01
Payer: COMMERCIAL

## 2020-09-01 PROCEDURE — 97140 MANUAL THERAPY 1/> REGIONS: CPT | Performed by: PHYSICAL THERAPIST

## 2020-09-01 PROCEDURE — 97110 THERAPEUTIC EXERCISES: CPT | Performed by: PHYSICAL THERAPIST

## 2020-09-01 NOTE — FLOWSHEET NOTE
[facility],  [address] [place]  Phone: [phone]  Fax [phone]        Date:  2020    Patient Name:  Paulette Heard    :  1965  MRN: [id number]  Restrictions/Precautions:    Medical/Treatment Diagnosis Information:  · Diagnosis: S46.011D  traumatic RTC tear    s/p Double row supraspinatus repair, subscap repair, biceps tenodesis, labral debridement    DOS:  20  · Treatment Diagnosis: right shoulder pain E17.193  Insurance/Certification information:  PT Insurance Information: BCBS- $2500 deductible met  0 copay, 20 PT   (used 10)  Needs auth  Physician Information:  Referring Practitioner: Dr. Radha Jacobs  Has the plan of care been signed (Y/N):        []  Yes  [x]  No     Date of Patient follow up with Physician: 20      Is this a Progress Report:     []  Yes  [x]  No        If Yes:  Date Range for reporting period:  Beginnin20  Ending:     Progress report will be due (10 Rx or 30 days whichever is less):        Recertification will be due (POC Duration  / 90 days whichever is less):        Visit # Insurance Allowable Auth Required   3 10 needs auth []  Yes []  No        Therapy Diagnosis/Practice Pattern:I      Number of Comorbidities:  [x]0     []1-2    []3+    Latex Allergy:  [x]NO      []YES  Preferred Language for Healthcare:   [x]English       []other:    SUBJECTIVE:  Pt is 5.5 weeks post op. Pt is sore today. He thinks it is from sleeping position in bed. Pt saw the Dr. Noah Gamboa: See eval   Observation:    Test measurements:    ROM Left Right   Shoulder Flex  85   Shoulder Abd     Shoulder ER  10   Shoulder IR     Elbow flex  WNL   Elbow ext  WNL   Strength  Left Right   Shoulder Flex  nt   Shoulder Scap  nt   Shoulder ER  nt   Shoulder IR  nt        Pain scale  2-3  /10   Quick dash   43 = 73%     RESTRICTIONS/PRECAUTIONS: history of bilat ACLs, achilles tendon repair. Exercises/Interventions:     Therapeutic Ex (83663) Sets/sec Reps Notes/CUES         Cane press/  Cane flex/  Cane ER X 10/ x10 X 10    Shrug :05 X 10 hep   SBS :05 X 10 hep   hep   hep   pendulum  X 10 hep   Cervical stretches (lev and UT) :20 3x hep   Table slides  X 10    Passive ER with cane X 10      pulleys  unable          Manual Intervention (84636)      Joint mobs  Inf and post  5 min    sidelying scap mobs  5 min    Passive flex and ER  8 min                      NMR re-education (50748)   CUES NEEDED                                   Therapeutic Exercise and NMR EXR  [x] (58595) Provided verbal/tactile cueing for activities related to strengthening, flexibility, endurance, ROM  for improvements in scapular, scapulothoracic and UE control with self care, reaching, carrying, lifting, house/yardwork, driving/computer work.    [] (00263) Provided verbal/tactile cueing for activities related to improving balance, coordination, kinesthetic sense, posture, motor skill, proprioception  to assist with  scapular, scapulothoracic and UE control with self care, reaching, carrying, lifting, house/yardwork, driving/computer work. Therapeutic Activities:    [] (11408 or 38967) Provided verbal/tactile cueing for activities related to improving balance, coordination, kinesthetic sense, posture, motor skill, proprioception and motor activation to allow for proper function of scapular, scapulothoracic and UE control with self care, carrying, lifting, driving/computer work.      Home Exercise Program:    [x] (82997) Reviewed/Progressed HEP activities related to strengthening, flexibility, endurance, ROM of scapular, scapulothoracic and UE control with self care, reaching, carrying, lifting, house/yardwork, driving/computer work  [] (62171) Reviewed/Progressed HEP activities related to improving balance, coordination, kinesthetic sense, posture, motor skill, proprioception of scapular, scapulothoracic and UE control with self care, reaching, carrying, lifting, house/yardwork, driving/computer work      Manual Treatments:  PROM / STM / Oscillations-Mobs:  G-I, II, III, IV (Jorge L, Inf., Post.)  [x] (08445) Provided manual therapy to mobilize soft tissue/joints of cervical/CT, scapular GHJ and UE for the purpose of modulating pain, promoting relaxation,  increasing ROM, reducing/eliminating soft tissue swelling/inflammation/restriction, improving soft tissue extensibility and allowing for proper ROM for normal function with self care, reaching, carrying, lifting, house/yardwork, driving/computer work    Modalities:      Charges:  Timed Code Treatment Minutes: 45   Total Treatment Minutes: 45       [] EVAL (LOW) 91314 (typically 20 minutes face-to-face)  [] EVAL (MOD) 68660 (typically 30 minutes face-to-face)  [] EVAL (HIGH) 59405 (typically 45 minutes face-to-face)  [] RE-EVAL     [x] MP(07049) x   2  [] IONTO  [] NMR (44543) x     [] VASO  [x] Manual (84544) x   1   [] Other:  [] TA x      [] Mech Traction (71115)  [] ES(attended) (42789)      [] ES (un) (42885):     GOALS:  Patient stated goal: return to full function  []? Progressing: []? Met: []? Not Met: []? Adjusted     Therapist goals for Patient:   Short Term Goals: To be achieved in: 2 weeks  1. Independent in HEP and progression per patient tolerance, in order to prevent re-injury. []? Progressing: []? Met: []? Not Met: []? Adjusted  2. Patient will have a decrease in pain to facilitate improvement in movement, function, and ADLs as indicated by Functional Deficits. []? Progressing: []? Met: []? Not Met: []? Adjusted     Long Term Goals: To be achieved in: 8 weeks  1. Disability index score of 30% or less for the [facility] to assist with reaching prior level of function. []? Progressing: []? Met: []? Not Met: []? Adjusted  2.  Patient will demonstrate increased AROM to 150 flex, 90 ER, and 70 IR to allow for proper joint functioning as indicated by patients Functional Deficits. []? Progressing: []? Met: []? Not Met: []? Adjusted  3. Patient will demonstrate an increase in Strength to 4+/5 throughout the right UE to allow for proper functional mobility as indicated by patients Functional Deficits. []? Progressing: []? Met: []? Not Met: []? Adjusted  4. Patient will return to dressing, driving, combing hair with right hand without increased symptoms or restriction. []? Progressing: []? Met: []? Not Met: []? Adjusted  5. Participate in [facility] for return to sport. []? Progressing: []? Met: []? Not Met: []? Adjusted          Progression Towards Functional goals:  [x] Patient is progressing as expected towards functional goals listed. [] Progression is slowed due to complexities listed. [] Progression has been slowed due to co-morbidities. [] Plan just implemented, too soon to assess goals progression  [] Other:     ASSESSMENT: Pt demonstrated a decrease in pain and improvement in PROM by end of visit. Overall Progression Towards Functional goals/ Treatment Progress Update:  [x] Patient is progressing as expected towards functional goals listed. [] Progression is slowed due to complexities/Impairments listed. [] Progression has been slowed due to co-morbidities.   [] Plan just implemented, too soon to assess goals progression <30days   [] Goals require adjustment due to lack of progress  [] Patient is not progressing as expected and requires additional follow up with physician  [] Other    Prognosis for POC: [x] Good [] Fair  [] Poor      Patient requires continued skilled intervention: [x] Yes  [] No    Treatment/Activity Tolerance:  [x] Patient able to complete treatment  [] Patient limited by fatigue  [] Patient limited by pain    [] Patient limited by other medical complications  [] Other:         Return to Play: (if applicable)   []  Stage 1: Intro to Strength   []  Stage 2: Return to Run and Strength   []  Stage 3: Return to Jump and Strength   []  Stage 4: Dynamic Strength and Agility   []  Stage 5: Sport Specific Training     []  Ready to Return to Play, Meets All Above Stages   []  Not Ready for Return to Sports   Comments:                               PLAN: See eval  [x] Continue per plan of care [] Alter current plan (see comments above)  [] Plan of care initiated [] Hold pending MD visit [] Discharge      Electronically signed by:  Zully Robison PT    Note: If patient does not return for scheduled/ recommended follow up visits, this note will serve as a discharge from care along with most recent update on progress.

## 2020-09-08 ENCOUNTER — OFFICE VISIT (OUTPATIENT)
Dept: ORTHOPEDIC SURGERY | Age: 55
End: 2020-09-08

## 2020-09-08 ENCOUNTER — HOSPITAL ENCOUNTER (OUTPATIENT)
Dept: PHYSICAL THERAPY | Age: 55
Setting detail: THERAPIES SERIES
Discharge: HOME | End: 2020-09-08
Payer: COMMERCIAL

## 2020-09-08 VITALS — BODY MASS INDEX: 29.78 KG/M2 | HEIGHT: 70 IN | WEIGHT: 208 LBS

## 2020-09-08 PROCEDURE — 99024 POSTOP FOLLOW-UP VISIT: CPT | Performed by: ORTHOPAEDIC SURGERY

## 2020-09-08 PROCEDURE — 97140 MANUAL THERAPY 1/> REGIONS: CPT | Performed by: PHYSICAL THERAPIST

## 2020-09-08 PROCEDURE — 97016 VASOPNEUMATIC DEVICE THERAPY: CPT | Performed by: PHYSICAL THERAPIST

## 2020-09-08 PROCEDURE — 97110 THERAPEUTIC EXERCISES: CPT | Performed by: PHYSICAL THERAPIST

## 2020-09-08 NOTE — FLOWSHEET NOTE
[facility],  [address] [place]  Phone: [phone]  Fax [phone]        Date:  2020    Patient Name:  Wesley Baldwin    :  1965  MRN: [id number]  Restrictions/Precautions:    Medical/Treatment Diagnosis Information:  · Diagnosis: S46.011D  traumatic RTC tear    s/p Double row supraspinatus repair, subscap repair, biceps tenodesis, labral debridement    DOS:  20  · Treatment Diagnosis: right shoulder pain E14.016  Insurance/Certification information:  PT Insurance Information: BCBS- $2500 deductible met  0 copay, 20 PT   (used 10)  Needs auth  Physician Information:  Referring Practitioner: Dr. Montrell Lozano  Has the plan of care been signed (Y/N):        []  Yes  [x]  No     Date of Patient follow up with Physician: 20      Is this a Progress Report:     []  Yes  [x]  No        If Yes:  Date Range for reporting period:  Beginnin20  Ending:     Progress report will be due (10 Rx or 30 days whichever is less):        Recertification will be due (POC Duration  / 90 days whichever is less):        Visit # Insurance Allowable Auth Required   4 10 needs auth []  Yes []  No        Therapy Diagnosis/Practice Pattern:I      Number of Comorbidities:  [x]0     []1-2    []3+    Latex Allergy:  [x]NO      []YES  Preferred Language for Healthcare:   [x]English       []other:    SUBJECTIVE:  Pt is 6.5 weeks post op. Pt is reducing the amount of advil he is taking. He is allowed to dc the sling. He has reduced wear time, and shoulder just get tired.       OBJECTIVE: See eval   Observation:    Test measurements:    ROM Left Right   Shoulder Flex  85   Shoulder Abd     Shoulder ER  10   Shoulder IR     Elbow flex  WNL   Elbow ext  WNL   Strength  Left Right   Shoulder Flex  nt   Shoulder Scap  nt   Shoulder ER  nt   Shoulder IR  nt        Pain scale  2-3  /10   Quick dash   43 = 73% RESTRICTIONS/PRECAUTIONS: history of bilat ACLs, achilles tendon repair. Exercises/Interventions:     Therapeutic Ex (74844) Sets/sec Reps Notes/CUES         Cane press/  Cane flex/  Cane ER X 10/ x10 X 10    hep   SBS :05 X 10 hep   hep   hep   pendulum  X 10 hep   Cervical stretches (lev and UT) :20 3x hep   Table slides  X 10    Passive ER with cane X 10      pulleys  X 10           Prone row/   Prone ext X 20 X 20     No $  X 20           Finisher  5# 4 exercises x 20 each    SB ball roll  X 10     Manual Intervention (94032)      Joint mobs  Inf and post  5 min    sidelying scap mobs  5 min    Passive flex and ER  8 min                      NMR re-education (16441)   CUES NEEDED                                   Therapeutic Exercise and NMR EXR  [x] (29687) Provided verbal/tactile cueing for activities related to strengthening, flexibility, endurance, ROM  for improvements in scapular, scapulothoracic and UE control with self care, reaching, carrying, lifting, house/yardwork, driving/computer work.    [] (00900) Provided verbal/tactile cueing for activities related to improving balance, coordination, kinesthetic sense, posture, motor skill, proprioception  to assist with  scapular, scapulothoracic and UE control with self care, reaching, carrying, lifting, house/yardwork, driving/computer work. Therapeutic Activities:    [] (45406 or 40397) Provided verbal/tactile cueing for activities related to improving balance, coordination, kinesthetic sense, posture, motor skill, proprioception and motor activation to allow for proper function of scapular, scapulothoracic and UE control with self care, carrying, lifting, driving/computer work.      Home Exercise Program:    [x] (42756) Reviewed/Progressed HEP activities related to strengthening, flexibility, endurance, ROM of scapular, scapulothoracic and UE control with self care, reaching, carrying, lifting, house/yardwork, driving/computer work  [] (45769) Reviewed/Progressed HEP activities related to improving balance, coordination, kinesthetic sense, posture, motor skill, proprioception of scapular, scapulothoracic and UE control with self care, reaching, carrying, lifting, house/yardwork, driving/computer work      Manual Treatments:  PROM / STM / Oscillations-Mobs:  G-I, II, III, IV (PA's, Inf., Post.)  [x] (51311) Provided manual therapy to mobilize soft tissue/joints of cervical/CT, scapular GHJ and UE for the purpose of modulating pain, promoting relaxation,  increasing ROM, reducing/eliminating soft tissue swelling/inflammation/restriction, improving soft tissue extensibility and allowing for proper ROM for normal function with self care, reaching, carrying, lifting, house/yardwork, driving/computer work    Modalities:      Charges:  Timed Code Treatment Minutes: 45   Total Treatment Minutes: 60       [] EVAL (LOW) 38678 (typically 20 minutes face-to-face)  [] EVAL (MOD) 10565 (typically 30 minutes face-to-face)  [] EVAL (HIGH) 39425 (typically 45 minutes face-to-face)  [] RE-EVAL     [x] FC(38107) x   2  [] IONTO  [] NMR (18567) x     [x] VASO  [x] Manual (90167) x   1   [] Other:  [] TA x      [] Mech Traction (38075)  [] ES(attended) (82927)      [] ES (un) (57390):     GOALS:  Patient stated goal: return to full function  []? Progressing: []? Met: []? Not Met: []? Adjusted     Therapist goals for Patient:   Short Term Goals: To be achieved in: 2 weeks  1. Independent in HEP and progression per patient tolerance, in order to prevent re-injury. []? Progressing: []? Met: []? Not Met: []? Adjusted  2. Patient will have a decrease in pain to facilitate improvement in movement, function, and ADLs as indicated by Functional Deficits. []? Progressing: []? Met: []? Not Met: []? Adjusted     Long Term Goals: To be achieved in: 8 weeks  1. Disability index score of 30% or less for the [facility] to assist with reaching prior level of function. []?  Progressing: []? Met: []? Not Met: []? Adjusted  2. Patient will demonstrate increased AROM to 150 flex, 90 ER, and 70 IR to allow for proper joint functioning as indicated by patients Functional Deficits. []? Progressing: []? Met: []? Not Met: []? Adjusted  3. Patient will demonstrate an increase in Strength to 4+/5 throughout the right UE to allow for proper functional mobility as indicated by patients Functional Deficits. []? Progressing: []? Met: []? Not Met: []? Adjusted  4. Patient will return to dressing, driving, combing hair with right hand without increased symptoms or restriction. []? Progressing: []? Met: []? Not Met: []? Adjusted  5. Participate in [facility] for return to sport. []? Progressing: []? Met: []? Not Met: []? Adjusted          Progression Towards Functional goals:  [x] Patient is progressing as expected towards functional goals listed. [] Progression is slowed due to complexities listed. [] Progression has been slowed due to co-morbidities. [] Plan just implemented, too soon to assess goals progression  [] Other:     ASSESSMENT: Pt demonstrated a decrease in pain and improvement in PROM by end of visit. Overall Progression Towards Functional goals/ Treatment Progress Update:  [x] Patient is progressing as expected towards functional goals listed. [] Progression is slowed due to complexities/Impairments listed. [] Progression has been slowed due to co-morbidities.   [] Plan just implemented, too soon to assess goals progression <30days   [] Goals require adjustment due to lack of progress  [] Patient is not progressing as expected and requires additional follow up with physician  [] Other    Prognosis for POC: [x] Good [] Fair  [] Poor      Patient requires continued skilled intervention: [x] Yes  [] No    Treatment/Activity Tolerance:  [x] Patient able to complete treatment  [] Patient limited by fatigue  [] Patient limited by pain    [] Patient limited by other medical complications  [] Other:         Return to Play: (if applicable)   []  Stage 1: Intro to Strength   []  Stage 2: Return to Run and Strength   []  Stage 3: Return to Jump and Strength   []  Stage 4: Dynamic Strength and Agility   []  Stage 5: Sport Specific Training     []  Ready to Return to Play, Meets All Above Stages   []  Not Ready for Return to Sports   Comments:                               PLAN: See eval  [x] Continue per plan of care [] Alter current plan (see comments above)  [] Plan of care initiated [] Hold pending MD visit [] Discharge      Electronically signed by:  Zully Robison PT    Note: If patient does not return for scheduled/ recommended follow up visits, this note will serve as a discharge from care along with most recent update on progress.

## 2020-09-15 ENCOUNTER — HOSPITAL ENCOUNTER (OUTPATIENT)
Dept: PHYSICAL THERAPY | Age: 55
Setting detail: THERAPIES SERIES
Discharge: HOME | End: 2020-09-15
Payer: COMMERCIAL

## 2020-09-15 ENCOUNTER — APPOINTMENT (OUTPATIENT)
Dept: PHYSICAL THERAPY | Age: 55
End: 2020-09-15
Payer: COMMERCIAL

## 2020-09-15 PROCEDURE — 97140 MANUAL THERAPY 1/> REGIONS: CPT | Performed by: PHYSICAL THERAPIST

## 2020-09-15 PROCEDURE — 97110 THERAPEUTIC EXERCISES: CPT | Performed by: PHYSICAL THERAPIST

## 2020-09-15 NOTE — FLOWSHEET NOTE
[facility],  [address] [place]  Phone: [phone]  Fax [phone]        Date:  9/15/2020    Patient Name:  Keisha Metzger    :  1965  MRN: [id number]  Restrictions/Precautions:    Medical/Treatment Diagnosis Information:  · Diagnosis: S46.011D  traumatic RTC tear    s/p Double row supraspinatus repair, subscap repair, biceps tenodesis, labral debridement    DOS:  20  · Treatment Diagnosis: right shoulder pain B14.898  Insurance/Certification information:  PT Insurance Information: BC[employer]- $2500 deductible met  0 copay, 20 PT   (used 10)  Needs auth  Physician Information:  Referring Practitioner: Dr. Isabel Adames  Has the plan of care been signed (Y/N):        []  Yes  [x]  No     Date of Patient follow up with Physician: 20      Is this a Progress Report:     []  Yes  [x]  No        If Yes:  Date Range for reporting period:  Beginnin20  Ending:     Progress report will be due (10 Rx or 30 days whichever is less):        Recertification will be due (POC Duration  / 90 days whichever is less):        Visit # Insurance Allowable Auth Required   5 10 needs auth []  Yes []  No        Therapy Diagnosis/Practice Pattern:I      Number of Comorbidities:  [x]0     []1-2    []3+    Latex Allergy:  [x]NO      []YES  Preferred Language for Healthcare:   [x]English       []other:    SUBJECTIVE:  Pt is 7.5 weeks post op. Pt's sleep is disrupted. He takes Advil prn. Pt is sore after going on one hour walk  OBJECTIVE: See eval   Observation:    Test measurements:    ROM Left Right   Shoulder Flex  85   Shoulder Abd     Shoulder ER  10   Shoulder IR     Elbow flex  WNL   Elbow ext  WNL   Strength  Left Right   Shoulder Flex  nt   Shoulder Scap  nt   Shoulder ER  nt   Shoulder IR  nt        Pain scale  2-3  /10   Quick dash   43 = 73%     RESTRICTIONS/PRECAUTIONS: history of bilat ACLs, achilles tendon repair. Priscilla:  9/8/20  Over the next a month were going to focus on some gentle progression with his glenohumeral stretching. Beginning active assisted to independent engagement of his rotator cuff. No significant resistance or quick dynamic movements. Exercises/Interventions:     Therapeutic Ex (31543) Sets/sec Reps Notes/CUES          Cane flex/  Cane ER  x10 X 10    hep   SBS :05 X 10 hep   Supine R/S a-p, m-l  X 20        pendulum  X 10 hep   Cervical stretches (lev and UT) :20 3x hep   Table slides  X 10    Passive ER with cane X 10      pulleys  X 10     Wall walks  X 10     Prone row/   Prone ext X 20 X 20     No $  X 20     TB row/  TB ext GR x 20 GR x 20    Finisher  5# 4 exercises x 20 each    SB ball roll  X 10     Manual Intervention (31626)      Joint mobs  Inf and post  5 min    sidelying scap mobs  10 min    Passive flex and ER  8 min                      NMR re-education (77182)   CUES NEEDED                                   Therapeutic Exercise and NMR EXR  [x] (42498) Provided verbal/tactile cueing for activities related to strengthening, flexibility, endurance, ROM  for improvements in scapular, scapulothoracic and UE control with self care, reaching, carrying, lifting, house/yardwork, driving/computer work.    [] (60897) Provided verbal/tactile cueing for activities related to improving balance, coordination, kinesthetic sense, posture, motor skill, proprioception  to assist with  scapular, scapulothoracic and UE control with self care, reaching, carrying, lifting, house/yardwork, driving/computer work. Therapeutic Activities:    [] (62868 or 60265) Provided verbal/tactile cueing for activities related to improving balance, coordination, kinesthetic sense, posture, motor skill, proprioception and motor activation to allow for proper function of scapular, scapulothoracic and UE control with self care, carrying, lifting, driving/computer work.      Home Exercise Program:    [x] (31855) Reviewed/Progressed HEP activities related to strengthening, flexibility, endurance, ROM of scapular, scapulothoracic and UE control with self care, reaching, carrying, lifting, house/yardwork, driving/computer work  [] (95248) Reviewed/Progressed HEP activities related to improving balance, coordination, kinesthetic sense, posture, motor skill, proprioception of scapular, scapulothoracic and UE control with self care, reaching, carrying, lifting, house/yardwork, driving/computer work      Manual Treatments:  PROM / STM / Oscillations-Mobs:  G-I, II, III, IV (PA's, Inf., Post.)  [x] (16237) Provided manual therapy to mobilize soft tissue/joints of cervical/CT, scapular GHJ and UE for the purpose of modulating pain, promoting relaxation,  increasing ROM, reducing/eliminating soft tissue swelling/inflammation/restriction, improving soft tissue extensibility and allowing for proper ROM for normal function with self care, reaching, carrying, lifting, house/yardwork, driving/computer work    Modalities:      Charges:  Timed Code Treatment Minutes: 45   Total Treatment Minutes: 60       [] EVAL (LOW) 52614 (typically 20 minutes face-to-face)  [] EVAL (MOD) 61762 (typically 30 minutes face-to-face)  [] EVAL (HIGH) 69758 (typically 45 minutes face-to-face)  [] RE-EVAL     [x] GV(25242) x   2  [] IONTO  [] NMR (95739) x     [] VASO  [x] Manual (77418) x   1   [x] Other: ice  [] TA x      [] Mech Traction (98270)  [] ES(attended) (72302)      [] ES (un) (24660):     GOALS:  Patient stated goal: return to full function  []? Progressing: []? Met: []? Not Met: []? Adjusted     Therapist goals for Patient:   Short Term Goals: To be achieved in: 2 weeks  1. Independent in HEP and progression per patient tolerance, in order to prevent re-injury. []? Progressing: []? Met: []? Not Met: []? Adjusted  2.  Patient will have a decrease in pain to facilitate improvement in movement, function, and ADLs as indicated by Functional Deficits. []? Progressing: []? Met: []? Not Met: []? Adjusted     Long Term Goals: To be achieved in: 8 weeks  1. Disability index score of 30% or less for the [facility] to assist with reaching prior level of function. []? Progressing: []? Met: []? Not Met: []? Adjusted  2. Patient will demonstrate increased AROM to 150 flex, 90 ER, and 70 IR to allow for proper joint functioning as indicated by patients Functional Deficits. []? Progressing: []? Met: []? Not Met: []? Adjusted  3. Patient will demonstrate an increase in Strength to 4+/5 throughout the right UE to allow for proper functional mobility as indicated by patients Functional Deficits. []? Progressing: []? Met: []? Not Met: []? Adjusted  4. Patient will return to dressing, driving, combing hair with right hand without increased symptoms or restriction. []? Progressing: []? Met: []? Not Met: []? Adjusted  5. Participate in [facility] for return to sport. []? Progressing: []? Met: []? Not Met: []? Adjusted          Progression Towards Functional goals:  [x] Patient is progressing as expected towards functional goals listed. [] Progression is slowed due to complexities listed. [] Progression has been slowed due to co-morbidities. [] Plan just implemented, too soon to assess goals progression  [] Other:     ASSESSMENT: shrug sign present with forward elevation. Overall Progression Towards Functional goals/ Treatment Progress Update:  [x] Patient is progressing as expected towards functional goals listed. [] Progression is slowed due to complexities/Impairments listed. [] Progression has been slowed due to co-morbidities.   [] Plan just implemented, too soon to assess goals progression <30days   [] Goals require adjustment due to lack of progress  [] Patient is not progressing as expected and requires additional follow up with physician  [] Other    Prognosis for POC: [x] Good [] Fair  [] Poor      Patient requires continued skilled intervention: [x] Yes  [] No    Treatment/Activity Tolerance:  [x] Patient able to complete treatment  [] Patient limited by fatigue  [] Patient limited by pain    [] Patient limited by other medical complications  [] Other:         Return to Play: (if applicable)   []  Stage 1: Intro to Strength   []  Stage 2: Return to Run and Strength   []  Stage 3: Return to Jump and Strength   []  Stage 4: Dynamic Strength and Agility   []  Stage 5: Sport Specific Training     []  Ready to Return to Play, Meets All Above Stages   []  Not Ready for Return to Sports   Comments:                               PLAN: See eval  [x] Continue per plan of care [] Alter current plan (see comments above)  [] Plan of care initiated [] Hold pending MD visit [] Discharge      Electronically signed by:  Jennifer Harris, PT    Note: If patient does not return for scheduled/ recommended follow up visits, this note will serve as a discharge from care along with most recent update on progress.

## 2020-09-22 ENCOUNTER — HOSPITAL ENCOUNTER (OUTPATIENT)
Dept: PHYSICAL THERAPY | Age: 55
Setting detail: THERAPIES SERIES
Discharge: HOME | End: 2020-09-22
Payer: COMMERCIAL

## 2020-09-22 PROCEDURE — 97140 MANUAL THERAPY 1/> REGIONS: CPT | Performed by: PHYSICAL THERAPIST

## 2020-09-22 PROCEDURE — 97110 THERAPEUTIC EXERCISES: CPT | Performed by: PHYSICAL THERAPIST

## 2020-09-22 NOTE — FLOWSHEET NOTE
[facility],  [address]  Phone: [phone]  Fax [phone]        Date:  2020    Patient Name:  Ashu Umana    :  1965  MRN: [id number]  Restrictions/Precautions:    Medical/Treatment Diagnosis Information:  · Diagnosis: S46.011D  traumatic RTC tear    s/p Double row supraspinatus repair, subscap repair, biceps tenodesis, labral debridement    DOS:  20  · Treatment Diagnosis: right shoulder pain F58.785  Insurance/Certification information:  PT Insurance Information: BCBS- $2500 deductible met  0 copay, 20 PT   (used 10)  Needs auth  Physician Information:  Referring Practitioner: Dr. William Mitchell  Has the plan of care been signed (Y/N):        []  Yes  [x]  No     Date of Patient follow up with Physician: 20      Is this a Progress Report:     []  Yes  [x]  No        If Yes:  Date Range for reporting period:  Beginnin20  Ending:     Progress report will be due (10 Rx or 30 days whichever is less):        Recertification will be due (POC Duration  / 90 days whichever is less):        Visit # Insurance Allowable Auth Required   6 10 needs auth []  Yes []  No        Therapy Diagnosis/Practice Pattern:I      Number of Comorbidities:  [x]0     []1-2    []3+    Latex Allergy:  [x]NO      []YES  Preferred Language for Healthcare:   [x]English       []other:    SUBJECTIVE:  Pt is 8.5 weeks post op. Pt reports he is sleeping longer in bed. Dressing is easier. OBJECTIVE: See eval   Observation:    Test measurements:    ROM Left Right   Shoulder Flex  85   Shoulder Abd     Shoulder ER  10   Shoulder IR     Elbow flex  WNL   Elbow ext  WNL   Strength  Left Right   Shoulder Flex  nt   Shoulder Scap  nt   Shoulder ER  nt   Shoulder IR  nt        Pain scale  2  /10   Quick dash   43 = 73%     RESTRICTIONS/PRECAUTIONS: history of bilat ACLs, achilles tendon repair.     [place]:  20 Over the next a month were going to focus on some gentle progression with his glenohumeral stretching. Beginning active assisted to independent engagement of his rotator cuff. No significant resistance or quick dynamic movements. Exercises/Interventions:     Therapeutic Ex (89318) Sets/sec Reps Notes/CUES          Cane flex/  Cane ER  x10 X 10    Supine post cap s :20  5x     hep      SL ERC 2 x 10    Prone ext  X 20     pendulum  X 10 hep   Cervical stretches (lev and UT) :20 3x hep   Table slides  X 10    Passive ER with cane X 10      pulleys  X 20     Wall walks  X 10     No $  X 20     TB row/  TB ext GR x 20 GR x 20    Finisher  5# 4 exercises x 20 each    SB ball roll  X 10     Manual Intervention (13856)      Joint mobs  Inf and post  6 min    sidelying scap mobs  10 min    PROM of shoulder. 8 min                      NMR re-education (17421)   CUES NEEDED                                   Therapeutic Exercise and NMR EXR  [x] (35165) Provided verbal/tactile cueing for activities related to strengthening, flexibility, endurance, ROM  for improvements in scapular, scapulothoracic and UE control with self care, reaching, carrying, lifting, house/yardwork, driving/computer work.    [] (12616) Provided verbal/tactile cueing for activities related to improving balance, coordination, kinesthetic sense, posture, motor skill, proprioception  to assist with  scapular, scapulothoracic and UE control with self care, reaching, carrying, lifting, house/yardwork, driving/computer work. Therapeutic Activities:    [] (43284 or 04524) Provided verbal/tactile cueing for activities related to improving balance, coordination, kinesthetic sense, posture, motor skill, proprioception and motor activation to allow for proper function of scapular, scapulothoracic and UE control with self care, carrying, lifting, driving/computer work.      Home Exercise Program:    [x] (01376) Reviewed/Progressed HEP activities related to strengthening, flexibility, endurance, ROM of scapular, scapulothoracic and UE control with self care, reaching, carrying, lifting, house/yardwork, driving/computer work  [] (73595) Reviewed/Progressed HEP activities related to improving balance, coordination, kinesthetic sense, posture, motor skill, proprioception of scapular, scapulothoracic and UE control with self care, reaching, carrying, lifting, house/yardwork, driving/computer work      Manual Treatments:  PROM / STM / Oscillations-Mobs:  G-I, II, III, IV (PA's, Inf., Post.)  [x] (01271) Provided manual therapy to mobilize soft tissue/joints of cervical/CT, scapular GHJ and UE for the purpose of modulating pain, promoting relaxation,  increasing ROM, reducing/eliminating soft tissue swelling/inflammation/restriction, improving soft tissue extensibility and allowing for proper ROM for normal function with self care, reaching, carrying, lifting, house/yardwork, driving/computer work    Modalities:      Charges:  Timed Code Treatment Minutes: 45   Total Treatment Minutes: 60       [] EVAL (LOW) 45947 (typically 20 minutes face-to-face)  [] EVAL (MOD) 73838 (typically 30 minutes face-to-face)  [] EVAL (HIGH) 59337 (typically 45 minutes face-to-face)  [] RE-EVAL     [x] UJ(68846) x   2  [] IONTO  [] NMR (97467) x     [] VASO  [x] Manual (40259) x   1   [x] Other: ice  [] TA x      [] Mech Traction (52293)  [] ES(attended) (30462)      [] ES (un) (99189):     GOALS:  Patient stated goal: return to full function  []? Progressing: []? Met: []? Not Met: []? Adjusted     Therapist goals for Patient:   Short Term Goals: To be achieved in: 2 weeks  1. Independent in HEP and progression per patient tolerance, in order to prevent re-injury. []? Progressing: []? Met: []? Not Met: []? Adjusted  2. Patient will have a decrease in pain to facilitate improvement in movement, function, and ADLs as indicated by Functional Deficits. []? Progressing: []? Met: []?  Not Met: []? Adjusted     Long Term Goals: To be achieved in: 8 weeks  1. Disability index score of 30% or less for the [facility] to assist with reaching prior level of function. []? Progressing: []? Met: []? Not Met: []? Adjusted  2. Patient will demonstrate increased AROM to 150 flex, 90 ER, and 70 IR to allow for proper joint functioning as indicated by patients Functional Deficits. []? Progressing: []? Met: []? Not Met: []? Adjusted  3. Patient will demonstrate an increase in Strength to 4+/5 throughout the right UE to allow for proper functional mobility as indicated by patients Functional Deficits. []? Progressing: []? Met: []? Not Met: []? Adjusted  4. Patient will return to dressing, driving, combing hair with right hand without increased symptoms or restriction. []? Progressing: []? Met: []? Not Met: []? Adjusted  5. Participate in [facility] [place] for return to sport. []? Progressing: []? Met: []? Not Met: []? Adjusted          Progression Towards Functional goals:  [x] Patient is progressing as expected towards functional goals listed. [] Progression is slowed due to complexities listed. [] Progression has been slowed due to co-morbidities. [] Plan just implemented, too soon to assess goals progression  [] Other:     ASSESSMENT: shrug sign present with forward elevation. Overall Progression Towards Functional goals/ Treatment Progress Update:  [x] Patient is progressing as expected towards functional goals listed. [] Progression is slowed due to complexities/Impairments listed. [] Progression has been slowed due to co-morbidities.   [] Plan just implemented, too soon to assess goals progression <30days   [] Goals require adjustment due to lack of progress  [] Patient is not progressing as expected and requires additional follow up with physician  [] Other    Prognosis for POC: [x] Good [] Fair  [] Poor      Patient requires continued skilled intervention: [x] Yes  [] No    Treatment/Activity Tolerance:  [x] Patient able to complete treatment  [] Patient limited by fatigue  [] Patient limited by pain    [] Patient limited by other medical complications  [] Other:         PLAN: See eval  [x] Continue per plan of care [] Alter current plan (see comments above)  [] Plan of care initiated [] Hold pending MD visit [] Discharge      Electronically signed by:  Zully Robison PT    Note: If patient does not return for scheduled/ recommended follow up visits, this note will serve as a discharge from care along with most recent update on progress.

## 2020-09-25 ENCOUNTER — HOSPITAL ENCOUNTER (OUTPATIENT)
Dept: PHYSICAL THERAPY | Age: 55
Setting detail: THERAPIES SERIES
Discharge: HOME | End: 2020-09-25
Payer: COMMERCIAL

## 2020-09-25 PROCEDURE — 97110 THERAPEUTIC EXERCISES: CPT | Performed by: PHYSICAL THERAPIST

## 2020-09-25 PROCEDURE — 97016 VASOPNEUMATIC DEVICE THERAPY: CPT | Performed by: PHYSICAL THERAPIST

## 2020-09-25 PROCEDURE — 97140 MANUAL THERAPY 1/> REGIONS: CPT | Performed by: PHYSICAL THERAPIST

## 2020-09-25 NOTE — FLOWSHEET NOTE
[facility], [address] [place] [address]  Phone: [phone]  Fax [phone]        Date:  2020    Patient Name:  Roberto Whaley    :  1965  MRN: [id number]  Restrictions/Precautions:    Medical/Treatment Diagnosis Information:  · Diagnosis: S46.011D  traumatic RTC tear    s/p Double row supraspinatus repair, subscap repair, biceps tenodesis, labral debridement    DOS:  20  · Treatment Diagnosis: right shoulder pain P74.239  Insurance/Certification information:  PT Insurance Information: BCBS- $2500 deductible met  0 copay, 20 PT   (used 10)  Needs auth  Physician Information:  Referring Practitioner: Dr. Patti Rolon  Has the plan of care been signed (Y/N):        []  Yes  [x]  No     Date of Patient follow up with Physician: 20      Is this a Progress Report:     []  Yes  [x]  No        If Yes:  Date Range for reporting period:  Beginnin20  Ending:     Progress report will be due (10 Rx or 30 days whichever is less):        Recertification will be due (POC Duration  / 90 days whichever is less):        Visit # Insurance Allowable Auth Required   7 10 needs auth []  Yes []  No        Therapy Diagnosis/Practice Pattern:I      Number of Comorbidities:  [x]0     []1-2    []3+    Latex Allergy:  [x]NO      []YES  Preferred Language for Healthcare:   [x]English       []other:    SUBJECTIVE:  Pt is 8.5 weeks post op. Pt does feel that ROM exercises are getting easier. OBJECTIVE: See eval   Observation:    Test measurements:    ROM Left Right   Shoulder Flex  85   Shoulder Abd     Shoulder ER  10   Shoulder IR     Elbow flex  WNL   Elbow ext  WNL   Strength  Left Right   Shoulder Flex  nt   Shoulder Scap  nt   Shoulder ER  nt   Shoulder IR  nt        Pain scale  2  /10   Quick dash   43 = 73%     RESTRICTIONS/PRECAUTIONS: history of bilat ACLs, achilles tendon repair.     [place]:  20  Over the next a month were going to focus on some gentle progression with his glenohumeral stretching. Beginning active assisted to independent engagement of his rotator cuff. No significant resistance or quick dynamic movements. Exercises/Interventions:     Therapeutic Ex (57033) Sets/sec Reps Notes/CUES          Cane flex/  Cane ER  x10 X 10    Supine post cap s :20  3x     hep      SL ERC 2 x 10    SL abd to 90  2 x 10    Prone ext 2# X 20     hep   hep   Table slides  Flex Gali  X 10          Modified IR s :10 X 10    pulleys  X 20     Wall walks  X 10     No $  X 20     TB row/  TB ext GR x 20 GR x 20    Finisher  5# 4 exercises x 20 each    SB ball roll  X 10     Manual Intervention (24039)      Joint mobs  Inf and post  6 min    sidelying scap mobs  10 min    PROM of shoulder. 8 min                      NMR re-education (74645)   CUES NEEDED                                   Therapeutic Exercise and NMR EXR  [x] (34486) Provided verbal/tactile cueing for activities related to strengthening, flexibility, endurance, ROM  for improvements in scapular, scapulothoracic and UE control with self care, reaching, carrying, lifting, house/yardwork, driving/computer work.    [] (42036) Provided verbal/tactile cueing for activities related to improving balance, coordination, kinesthetic sense, posture, motor skill, proprioception  to assist with  scapular, scapulothoracic and UE control with self care, reaching, carrying, lifting, house/yardwork, driving/computer work. Therapeutic Activities:    [] (29663 or 55156) Provided verbal/tactile cueing for activities related to improving balance, coordination, kinesthetic sense, posture, motor skill, proprioception and motor activation to allow for proper function of scapular, scapulothoracic and UE control with self care, carrying, lifting, driving/computer work.      Home Exercise Program:    [x] (31716) Reviewed/Progressed HEP activities related to strengthening, flexibility, endurance, ROM of scapular, scapulothoracic and UE control with self care, reaching, carrying, lifting, house/yardwork, driving/computer work  [] (99495) Reviewed/Progressed HEP activities related to improving balance, coordination, kinesthetic sense, posture, motor skill, proprioception of scapular, scapulothoracic and UE control with self care, reaching, carrying, lifting, house/yardwork, driving/computer work      Manual Treatments:  PROM / STM / Oscillations-Mobs:  G-I, II, III, IV (PA's, Inf., Post.)  [x] (26673) Provided manual therapy to mobilize soft tissue/joints of cervical/CT, scapular GHJ and UE for the purpose of modulating pain, promoting relaxation,  increasing ROM, reducing/eliminating soft tissue swelling/inflammation/restriction, improving soft tissue extensibility and allowing for proper ROM for normal function with self care, reaching, carrying, lifting, house/yardwork, driving/computer work    Modalities:      Charges:  Timed Code Treatment Minutes: 45   Total Treatment Minutes: 60       [] EVAL (LOW) 00664 (typically 20 minutes face-to-face)  [] EVAL (MOD) 04154 (typically 30 minutes face-to-face)  [] EVAL (HIGH) 70516 (typically 45 minutes face-to-face)  [] RE-EVAL     [x] VI(73079) x   2  [] IONTO  [] NMR (53214) x     [x] VASO  [x] Manual (08752) x   1   [] Other: ice  [] TA x      [] Mech Traction (97996)  [] ES(attended) (21180)      [] ES (un) (13117):     GOALS:  Patient stated goal: return to full function  []? Progressing: []? Met: []? Not Met: []? Adjusted     Therapist goals for Patient:   Short Term Goals: To be achieved in: 2 weeks  1. Independent in HEP and progression per patient tolerance, in order to prevent re-injury. []? Progressing: []? Met: []? Not Met: []? Adjusted  2. Patient will have a decrease in pain to facilitate improvement in movement, function, and ADLs as indicated by Functional Deficits. []? Progressing: []? Met: []?  Not Met: []? Adjusted     Long Term Goals: To be achieved in: 8 weeks  1. Disability index score of 30% or less for the [facility] to assist with reaching prior level of function. []? Progressing: []? Met: []? Not Met: []? Adjusted  2. Patient will demonstrate increased AROM to 150 flex, 90 ER, and 70 IR to allow for proper joint functioning as indicated by patients Functional Deficits. []? Progressing: []? Met: []? Not Met: []? Adjusted  3. Patient will demonstrate an increase in Strength to 4+/5 throughout the right UE to allow for proper functional mobility as indicated by patients Functional Deficits. []? Progressing: []? Met: []? Not Met: []? Adjusted  4. Patient will return to dressing, driving, combing hair with right hand without increased symptoms or restriction. []? Progressing: []? Met: []? Not Met: []? Adjusted  5. Participate in [facility] for return to sport. []? Progressing: []? Met: []? Not Met: []? Adjusted          Progression Towards Functional goals:  [x] Patient is progressing as expected towards functional goals listed. [] Progression is slowed due to complexities listed. [] Progression has been slowed due to co-morbidities. [] Plan just implemented, too soon to assess goals progression  [] Other:     ASSESSMENT: shrug sign present with forward elevation. Overall Progression Towards Functional goals/ Treatment Progress Update:  [x] Patient is progressing as expected towards functional goals listed. [] Progression is slowed due to complexities/Impairments listed. [] Progression has been slowed due to co-morbidities.   [] Plan just implemented, too soon to assess goals progression <30days   [] Goals require adjustment due to lack of progress  [] Patient is not progressing as expected and requires additional follow up with physician  [] Other    Prognosis for POC: [x] Good [] Fair  [] Poor      Patient requires continued skilled intervention: [x] Yes  [] No    Treatment/Activity Tolerance:  [x] Patient able to complete treatment  [] Patient limited by fatigue  [] Patient limited by pain    [] Patient limited by other medical complications  [] Other:         PLAN: See eval  [x] Continue per plan of care [] Alter current plan (see comments above)  [] Plan of care initiated [] Hold pending MD visit [] Discharge      Electronically signed by:  Tulio Roach PT    Note: If patient does not return for scheduled/ recommended follow up visits, this note will serve as a discharge from care along with most recent update on progress.

## 2020-09-29 ENCOUNTER — HOSPITAL ENCOUNTER (OUTPATIENT)
Dept: PHYSICAL THERAPY | Age: 55
Setting detail: THERAPIES SERIES
Discharge: HOME | End: 2020-09-29
Payer: COMMERCIAL

## 2020-09-29 PROCEDURE — 97016 VASOPNEUMATIC DEVICE THERAPY: CPT | Performed by: PHYSICAL THERAPIST

## 2020-09-29 PROCEDURE — 97140 MANUAL THERAPY 1/> REGIONS: CPT | Performed by: PHYSICAL THERAPIST

## 2020-09-29 PROCEDURE — 97110 THERAPEUTIC EXERCISES: CPT | Performed by: PHYSICAL THERAPIST

## 2020-09-29 NOTE — FLOWSHEET NOTE
[facility], [address] [place]  Phone: [phone]  Fax [phone]        Date:  2020    Patient Name:  Marilyn Renteria    :  1965  MRN: [id number]  Restrictions/Precautions:    Medical/Treatment Diagnosis Information:  · Diagnosis: S46.011D  traumatic RTC tear    s/p Double row supraspinatus repair, subscap repair, biceps tenodesis, labral debridement    DOS:  20  · Treatment Diagnosis: right shoulder pain J78.537  Insurance/Certification information:  PT Insurance Information: BCBS- $2500 deductible met  0 copay, 20 PT   (used 10)  Needs auth  Physician Information:  Referring Practitioner: Dr. Nito Gipson  Has the plan of care been signed (Y/N):        []  Yes  [x]  No     Date of Patient follow up with Physician: 20      Is this a Progress Report:     []  Yes  [x]  No        If Yes:  Date Range for reporting period:  Beginnin20  Ending:     Progress report will be due (10 Rx or 30 days whichever is less):        Recertification will be due (POC Duration  / 90 days whichever is less):        Visit # Insurance Allowable Auth Required   8 10 needs auth []  Yes []  No        Therapy Diagnosis/Practice Pattern:I      Number of Comorbidities:  [x]0     []1-2    []3+    Latex Allergy:  [x]NO      []YES  Preferred Language for Healthcare:   [x]English       []other:    SUBJECTIVE:  Pt is 8.5 weeks post op. Pt does feel that ROM exercises are getting easier. OBJECTIVE: See eval   Observation:    Test measurements:    ROM Left Right   Shoulder Flex  140   Shoulder Abd     Shoulder ER  45   Shoulder IR     Elbow flex  WNL   Elbow ext  WNL   Strength  Left Right   Shoulder Flex  nt   Shoulder Scap  nt   Shoulder ER  nt   Shoulder IR  nt        Pain scale  2  /10   Quick dash   43 = 73%     RESTRICTIONS/PRECAUTIONS: history of bilat ACLs, achilles tendon repair.     [place]:  20  Over the next a month were going to focus on some gentle progression with his glenohumeral stretching. Beginning active assisted to independent engagement of his rotator cuff. No significant resistance or quick dynamic movements. Exercises/Interventions:      Therapeutic Ex (46546) Sets/sec Reps Notes/CUES          Cane flex/  Cane ER  x10 X 10    Supine post cap s :20  3x     Serratus punch  2 x 10  hep   Supine R/S a-p, m-l  X 20     SL ERC 2 x 10    SL abd to 90  2 x 10    Prone ext 2# X 20     hep   hep   Table slides  Flex Gali  X 10          Modified IR s :10 X 10    pulleys  X 20     Wall walks  X 10     No $  X 20     TB row/  TB ext GR x 20 GR x 20    Finisher  5# 4 exercises x 20 each    SB ball roll  X 10     Manual Intervention (11851)      Joint mobs  Inf and post  6 min    sidelying scap mobs  10 min    PROM of shoulder. 8 min                      NMR re-education (42086)   CUES NEEDED                                   Therapeutic Exercise and NMR EXR  [x] (22658) Provided verbal/tactile cueing for activities related to strengthening, flexibility, endurance, ROM  for improvements in scapular, scapulothoracic and UE control with self care, reaching, carrying, lifting, house/yardwork, driving/computer work.    [] (81692) Provided verbal/tactile cueing for activities related to improving balance, coordination, kinesthetic sense, posture, motor skill, proprioception  to assist with  scapular, scapulothoracic and UE control with self care, reaching, carrying, lifting, house/yardwork, driving/computer work. Therapeutic Activities:    [] (57285 or 59672) Provided verbal/tactile cueing for activities related to improving balance, coordination, kinesthetic sense, posture, motor skill, proprioception and motor activation to allow for proper function of scapular, scapulothoracic and UE control with self care, carrying, lifting, driving/computer work.      Home Exercise Program:    [x] (10663) Deficits. []? Progressing: []? Met: []? Not Met: []? Adjusted     Long Term Goals: To be achieved in: 8 weeks  1. Disability index score of 30% or less for the [facility] to assist with reaching prior level of function. []? Progressing: []? Met: []? Not Met: []? Adjusted  2. Patient will demonstrate increased AROM to 150 flex, 90 ER, and 70 IR to allow for proper joint functioning as indicated by patients Functional Deficits. []? Progressing: []? Met: []? Not Met: []? Adjusted  3. Patient will demonstrate an increase in Strength to 4+/5 throughout the right UE to allow for proper functional mobility as indicated by patients Functional Deficits. []? Progressing: []? Met: []? Not Met: []? Adjusted  4. Patient will return to dressing, driving, combing hair with right hand without increased symptoms or restriction. []? Progressing: []? Met: []? Not Met: []? Adjusted  5. Participate in [facility] for return to sport. []? Progressing: []? Met: []? Not Met: []? Adjusted          Progression Towards Functional goals:  [x] Patient is progressing as expected towards functional goals listed. [] Progression is slowed due to complexities listed. [] Progression has been slowed due to co-morbidities. [] Plan just implemented, too soon to assess goals progression  [] Other:     ASSESSMENT:Shrug presents around 90 degrees. Pt is having gradual improvement in PROM    Overall Progression Towards Functional goals/ Treatment Progress Update:  [x] Patient is progressing as expected towards functional goals listed. [] Progression is slowed due to complexities/Impairments listed. [] Progression has been slowed due to co-morbidities.   [] Plan just implemented, too soon to assess goals progression <30days   [] Goals require adjustment due to lack of progress  [] Patient is not progressing as expected and requires additional follow up with physician  [] Other    Prognosis for POC: [x] Good [] Fair  [] Poor      Patient requires continued skilled intervention: [x] Yes  [] No    Treatment/Activity Tolerance:  [x] Patient able to complete treatment  [] Patient limited by fatigue  [] Patient limited by pain    [] Patient limited by other medical complications  [] Other:         PLAN: See eval  [x] Continue per plan of care [] Alter current plan (see comments above)  [] Plan of care initiated [] Hold pending MD visit [] Discharge      Electronically signed by:  Bea Castellanos PT    Note: If patient does not return for scheduled/ recommended follow up visits, this note will serve as a discharge from care along with most recent update on progress.

## 2020-10-05 ENCOUNTER — HOSPITAL ENCOUNTER (OUTPATIENT)
Dept: PHYSICAL THERAPY | Age: 55
Setting detail: THERAPIES SERIES
Discharge: HOME | End: 2020-10-05
Payer: COMMERCIAL

## 2020-10-05 PROCEDURE — 97110 THERAPEUTIC EXERCISES: CPT | Performed by: PHYSICAL THERAPIST

## 2020-10-05 PROCEDURE — 97140 MANUAL THERAPY 1/> REGIONS: CPT | Performed by: PHYSICAL THERAPIST

## 2020-10-05 NOTE — FLOWSHEET NOTE
[facility], [address]  Phone: [phone]  Fax [phone]        Date:  10/5/2020    Patient Name:  Li Ma    :  1965  MRN: [id number]  Restrictions/Precautions:    Medical/Treatment Diagnosis Information:  · Diagnosis: S46.011D  traumatic RTC tear    s/p Double row supraspinatus repair, subscap repair, biceps tenodesis, labral debridement    DOS:  20  · Treatment Diagnosis: right shoulder pain D30.766  Insurance/Certification information:  PT Insurance Information: BCBS- $2500 deductible met  0 copay, 20 PT   (used 10)  Needs auth  Physician Information:  Referring Practitioner: Dr. Chasidy Crum  Has the plan of care been signed (Y/N):        []  Yes  [x]  No     Date of Patient follow up with Physician: 20      Is this a Progress Report:     []  Yes  [x]  No        If Yes:  Date Range for reporting period:  Beginnin20  Ending:     Progress report will be due (10 Rx or 30 days whichever is less):        Recertification will be due (POC Duration  / 90 days whichever is less):        Visit # Insurance Allowable Auth Required   9 10 needs auth []  Yes []  No        Therapy Diagnosis/Practice Pattern:I      Number of Comorbidities:  [x]0     []1-2    []3+    Latex Allergy:  [x]NO      []YES  Preferred Language for Healthcare:   [x]English       []other:    SUBJECTIVE:  Pt is 8.5 weeks post op. Pt does feel that ROM exercises are getting easier. OBJECTIVE: See eval   Observation:    Test measurements:    ROM Left Right   Shoulder Flex  140   Shoulder Abd     Shoulder ER  45   Shoulder IR     Elbow flex  WNL   Elbow ext  WNL   Strength  Left Right   Shoulder Flex  nt   Shoulder Scap  nt   Shoulder ER  nt   Shoulder IR  nt        Pain scale  2  /10   Quick dash   43 = 73%     RESTRICTIONS/PRECAUTIONS: history of bilat ACLs, achilles tendon repair.     [place]:  20  Over the (67249) Reviewed/Progressed HEP activities related to strengthening, flexibility, endurance, ROM of scapular, scapulothoracic and UE control with self care, reaching, carrying, lifting, house/yardwork, driving/computer work  [] (01506) Reviewed/Progressed HEP activities related to improving balance, coordination, kinesthetic sense, posture, motor skill, proprioception of scapular, scapulothoracic and UE control with self care, reaching, carrying, lifting, house/yardwork, driving/computer work      Manual Treatments:  PROM / STM / Oscillations-Mobs:  G-I, II, III, IV (PA's, Inf., Post.)  [x] (37180) Provided manual therapy to mobilize soft tissue/joints of cervical/CT, scapular GHJ and UE for the purpose of modulating pain, promoting relaxation,  increasing ROM, reducing/eliminating soft tissue swelling/inflammation/restriction, improving soft tissue extensibility and allowing for proper ROM for normal function with self care, reaching, carrying, lifting, house/yardwork, driving/computer work    Modalities:      Charges:  Timed Code Treatment Minutes: 45   Total Treatment Minutes: 60       [] EVAL (LOW) 69789 (typically 20 minutes face-to-face)  [] EVAL (MOD) 35257 (typically 30 minutes face-to-face)  [] EVAL (HIGH) 97659 (typically 45 minutes face-to-face)  [] RE-EVAL     [x] PT(58840) x   2  [] IONTO  [] NMR (70102) x     [] VASO  [x] Manual (65738) x   1   [x] Other: ice  [] TA x      [] Mech Traction (16139)  [] ES(attended) (89407)      [] ES (un) (29092):     GOALS:  Patient stated goal: return to full function  []? Progressing: []? Met: []? Not Met: []? Adjusted     Therapist goals for Patient:   Short Term Goals: To be achieved in: 2 weeks  1. Independent in HEP and progression per patient tolerance, in order to prevent re-injury. []? Progressing: []? Met: []? Not Met: []? Adjusted  2.  Patient will have a decrease in pain to facilitate improvement in movement, function, and ADLs as indicated by Functional No    Treatment/Activity Tolerance:  [x] Patient able to complete treatment  [] Patient limited by fatigue  [] Patient limited by pain    [] Patient limited by other medical complications  [] Other:         PLAN: See eval  [x] Continue per plan of care [] Alter current plan (see comments above)  [] Plan of care initiated [] Hold pending MD visit [] Discharge      Electronically signed by:  Katerina Harvey PT    Note: If patient does not return for scheduled/ recommended follow up visits, this note will serve as a discharge from care along with most recent update on progress.

## 2020-10-06 ENCOUNTER — OFFICE VISIT (OUTPATIENT)
Dept: ORTHOPEDIC SURGERY | Age: 55
End: 2020-10-06

## 2020-10-06 ENCOUNTER — APPOINTMENT (OUTPATIENT)
Dept: PHYSICAL THERAPY | Age: 55
End: 2020-10-06
Payer: COMMERCIAL

## 2020-10-06 VITALS — BODY MASS INDEX: 29.78 KG/M2 | WEIGHT: 208 LBS | HEIGHT: 70 IN

## 2020-10-06 PROCEDURE — 99024 POSTOP FOLLOW-UP VISIT: CPT | Performed by: ORTHOPAEDIC SURGERY

## 2020-10-06 NOTE — PROGRESS NOTES
Surgery: Arthroscopic repair of a large traumatic anterior superior rotator cuff tear and biceps tenodesis    Post-Op Week:  11    Chief Complaint:  Post-Op Check (RIGHT RCR 7/22/2020)      History of Present of Illness: Chacorta's been doing very well. Said pain is a mild. Some soreness post therapy. He still has a bit of axillary stiffness but is making good progress. Review of Systems  Pertinent items are noted in HPI  Denies fever, chills, confusion, bowel/bladder active change. Review of systems reviewed from Patient History Form dated on October 6 and available in the patient's chart under the Media tab. Examination:  On exam today for elevation 120, X rotation 45. He is a good biceps contour and contraction. Good isometrics. Radiology:     None today    No orders of the defined types were placed in this encounter. Impression:  Progressing well      Treatment Plan:  I have provided him with some reassurance that this point it would take a traumatic injury to sustain a re-tear. His axillary stiffness is quite normal and natural for such an injury pattern and surgery. Infections that he suggest to be correlated with good healing. Continue with his daily stretching. Light strengthening work. Remain in his physical therapy. Look for to see him back in 6 weeks. We did have a discussion regarding potential follow-up MRI in 6 months          [facility]     This dictation was performed with a verbal recognition program (DRAGON) and it was checked for errors. It is possible that there are still dictated errors within this office note. If so, please bring any errors to my attention for an addendum. All efforts were made to ensure that this office note is accurate.

## 2020-10-09 ENCOUNTER — HOSPITAL ENCOUNTER (OUTPATIENT)
Dept: PHYSICAL THERAPY | Age: 55
Setting detail: THERAPIES SERIES
Discharge: HOME | End: 2020-10-09
Payer: COMMERCIAL

## 2020-10-09 PROCEDURE — 97110 THERAPEUTIC EXERCISES: CPT | Performed by: PHYSICAL THERAPIST

## 2020-10-09 PROCEDURE — 97016 VASOPNEUMATIC DEVICE THERAPY: CPT | Performed by: PHYSICAL THERAPIST

## 2020-10-09 PROCEDURE — 97140 MANUAL THERAPY 1/> REGIONS: CPT | Performed by: PHYSICAL THERAPIST

## 2020-10-09 NOTE — FLOWSHEET NOTE
Over the next a month were going to focus on some gentle progression with his glenohumeral stretching. Beginning active assisted to independent engagement of his rotator cuff. No significant resistance or quick dynamic movements. Exercises/Interventions:      Therapeutic Ex (67549) Sets/sec Reps Notes/CUES          Cane flex/  Cane ER  x10  With bridge X 10    Supine ER LLLD  3x  ;20      Supine post cap s :20  3x     Serratus punch 2# 2 x 10  hep   Supine R/S a-p, m-l 2# X 20     SL ERC 1# 2 x 10     SL abd to 90  2 x 10    SL IR s :10  5x     Prone ext 2# X 20     hep   hep   Table slides  Flex Gali            Modified IR s :10 X 10    pulleys  X 20     Wall walks  X 10     TB  IR/ ER RD X 20     TB row/  TB ext GR x 20 GR x 20          Manual Intervention (05575)      Joint mobs  Inf and post  6 min    sidelying scap mobs  5 min    PROM of shoulder. 10 min                      NMR re-education (62345)   CUES NEEDED                                   Therapeutic Exercise and NMR EXR  [x] (71461) Provided verbal/tactile cueing for activities related to strengthening, flexibility, endurance, ROM  for improvements in scapular, scapulothoracic and UE control with self care, reaching, carrying, lifting, house/yardwork, driving/computer work.    [] (10599) Provided verbal/tactile cueing for activities related to improving balance, coordination, kinesthetic sense, posture, motor skill, proprioception  to assist with  scapular, scapulothoracic and UE control with self care, reaching, carrying, lifting, house/yardwork, driving/computer work. Therapeutic Activities:    [] (23322 or 60649) Provided verbal/tactile cueing for activities related to improving balance, coordination, kinesthetic sense, posture, motor skill, proprioception and motor activation to allow for proper function of scapular, scapulothoracic and UE control with self care, carrying, lifting, driving/computer work.      Home Exercise Program: [x] (91244) Reviewed/Progressed HEP activities related to strengthening, flexibility, endurance, ROM of scapular, scapulothoracic and UE control with self care, reaching, carrying, lifting, house/yardwork, driving/computer work  [] (07575) Reviewed/Progressed HEP activities related to improving balance, coordination, kinesthetic sense, posture, motor skill, proprioception of scapular, scapulothoracic and UE control with self care, reaching, carrying, lifting, house/yardwork, driving/computer work      Manual Treatments:  PROM / STM / Oscillations-Mobs:  G-I, II, III, IV (PA's, Inf., Post.)  [x] (84298) Provided manual therapy to mobilize soft tissue/joints of cervical/CT, scapular GHJ and UE for the purpose of modulating pain, promoting relaxation,  increasing ROM, reducing/eliminating soft tissue swelling/inflammation/restriction, improving soft tissue extensibility and allowing for proper ROM for normal function with self care, reaching, carrying, lifting, house/yardwork, driving/computer work    Modalities: [place] to shoulder x 15 minutes     Charges:  Timed Code Treatment Minutes: 45   Total Treatment Minutes: 60       [] EVAL (LOW) 15134 (typically 20 minutes face-to-face)  [] EVAL (MOD) 11046 (typically 30 minutes face-to-face)  [] EVAL (HIGH) 15155 (typically 45 minutes face-to-face)  [] RE-EVAL     [x] QT(56464) x   2  [] IONTO  [] NMR (00268) x     [x] VASO  [x] Manual (27717) x   1   [x] Other: ice  [] TA x      [] Mech Traction (22764)  [] ES(attended) (22032)      [] ES (un) (75038):     GOALS:  Patient stated goal: return to full function  []? Progressing: []? Met: []? Not Met: []? Adjusted     Therapist goals for Patient:   Short Term Goals: To be achieved in: 2 weeks  1. Independent in HEP and progression per patient tolerance, in order to prevent re-injury. []? Progressing: []? Met: []? Not Met: []? Adjusted  2.  Patient will have a decrease in pain to facilitate improvement in movement, Good [] Fair  [] Poor      Patient requires continued skilled intervention: [x] Yes  [] No    Treatment/Activity Tolerance:  [x] Patient able to complete treatment  [] Patient limited by fatigue  [] Patient limited by pain    [] Patient limited by other medical complications  [] Other:         PLAN: See eval  [x] Continue per plan of care [] Alter current plan (see comments above)  [] Plan of care initiated [] Hold pending MD visit [] Discharge      Electronically signed by:  Vale Davidson PT    Note: If patient does not return for scheduled/ recommended follow up visits, this note will serve as a discharge from care along with most recent update on progress.

## 2020-10-19 ENCOUNTER — HOSPITAL ENCOUNTER (OUTPATIENT)
Dept: PHYSICAL THERAPY | Age: 55
Setting detail: THERAPIES SERIES
Discharge: HOME | End: 2020-10-19
Payer: COMMERCIAL

## 2020-10-19 PROCEDURE — 97140 MANUAL THERAPY 1/> REGIONS: CPT | Performed by: PHYSICAL THERAPIST

## 2020-10-19 PROCEDURE — 97110 THERAPEUTIC EXERCISES: CPT | Performed by: PHYSICAL THERAPIST

## 2020-10-19 NOTE — FLOWSHEET NOTE
[facility],  [address] [place]  Phone: [phone]  Fax [phone]        Date:  10/19/2020    Patient Name:  Li Ma    :  1965  MRN: [id number]  Restrictions/Precautions:    Medical/Treatment Diagnosis Information:  · Diagnosis: S46.011D  traumatic RTC tear    s/p Double row supraspinatus repair, subscap repair, biceps tenodesis, labral debridement    DOS:  20  · Treatment Diagnosis: right shoulder pain V05.294  Insurance/Certification information:  PT Insurance Information: BCBS- $2500 deductible met  0 copay, 20 PT   (used 10)  Needs auth  Physician Information:  Referring Practitioner: Dr. Chasidy Crum  Has the plan of care been signed (Y/N):        []  Yes  [x]  No     Date of Patient follow up with Physician: 20      Is this a Progress Report:     []  Yes  [x]  No        If Yes:  Date Range for reporting period:  Beginnin20  Ending:     Progress report will be due (10 Rx or 30 days whichever is less):        Recertification will be due (POC Duration  / 90 days whichever is less):        Visit # Insurance Allowable Auth Required   11 10  []  Yes []  No        Therapy Diagnosis/Practice Pattern:I      Number of Comorbidities:  [x]0     []1-2    []3+    Latex Allergy:  [x]NO      []YES  Preferred Language for Healthcare:   [x]English       []other:    SUBJECTIVE: Pt just returned from vacation. Pt felt good during vacation. Pt only had c/o fatigue in his upper trap. OBJECTIVE: See eval   Observation:    Test measurements:    ROM Left Right   Shoulder Flex  140   Shoulder Abd     Shoulder ER  45   Shoulder IR     Elbow flex  WNL   Elbow ext  WNL   Strength  Left Right   Shoulder Flex  nt   Shoulder Scap  nt   Shoulder ER  nt   Shoulder IR  nt        Pain scale  2  /10   Quick dash   43 = 73%     RESTRICTIONS/PRECAUTIONS: history of bilat ACLs, achilles tendon repair.     [place]: Adjusted     Long Term Goals: To be achieved in: 8 weeks  1. Disability index score of 30% or less for the [facility] to assist with reaching prior level of function. []? Progressing: []? Met: []? Not Met: []? Adjusted  2. Patient will demonstrate increased AROM to 150 flex, 90 ER, and 70 IR to allow for proper joint functioning as indicated by patients Functional Deficits. [x]? Progressing: []? Met: []? Not Met: []? Adjusted  3. Patient will demonstrate an increase in Strength to 4+/5 throughout the right UE to allow for proper functional mobility as indicated by patients Functional Deficits. []? Progressing: []? Met: []? Not Met: []? Adjusted  4. Patient will return to dressing, driving, combing hair with right hand without increased symptoms or restriction. [x]? Progressing: []? Met: []? Not Met: []? Adjusted  5. Participate in [facility] for return to sport. []? Progressing: []? Met: []? Not Met: []? Adjusted          Progression Towards Functional goals:  [x] Patient is progressing as expected towards functional goals listed. [] Progression is slowed due to complexities listed. [] Progression has been slowed due to co-morbidities. [] Plan just implemented, too soon to assess goals progression  [] Other:     ASSESSMENT:   Pt is running out of visit. Reviewed HEP. Pt to focus on manuals in PT>     Overall Progression Towards Functional goals/ Treatment Progress Update:  [x] Patient is progressing as expected towards functional goals listed. [] Progression is slowed due to complexities/Impairments listed. [] Progression has been slowed due to co-morbidities.   [] Plan just implemented, too soon to assess goals progression <30days   [] Goals require adjustment due to lack of progress  [] Patient is not progressing as expected and requires additional follow up with physician  [] Other    Prognosis for POC: [x] Good [] Fair  [] Poor      Patient requires continued skilled intervention: [x] Yes  [] No    Treatment/Activity Tolerance:  [x] Patient able to complete treatment  [] Patient limited by fatigue  [] Patient limited by pain    [] Patient limited by other medical complications  [] Other:         PLAN: See eval  [x] Continue per plan of care [] Alter current plan (see comments above)  [] Plan of care initiated [] Hold pending MD visit [] Discharge      Electronically signed by:  Jacob Santana PT    Note: If patient does not return for scheduled/ recommended follow up visits, this note will serve as a discharge from care along with most recent update on progress.

## 2020-10-26 ENCOUNTER — HOSPITAL ENCOUNTER (OUTPATIENT)
Dept: PHYSICAL THERAPY | Age: 55
Setting detail: THERAPIES SERIES
Discharge: HOME | End: 2020-10-26
Payer: COMMERCIAL

## 2020-10-26 PROCEDURE — 97110 THERAPEUTIC EXERCISES: CPT | Performed by: PHYSICAL THERAPIST

## 2020-10-26 PROCEDURE — 97140 MANUAL THERAPY 1/> REGIONS: CPT | Performed by: PHYSICAL THERAPIST

## 2020-10-26 NOTE — FLOWSHEET NOTE
[facility], [address]  Phone: [phone]  Fax [phone]        Date:  10/26/2020    Patient Name:  Arya Loza    :  1965  MRN: [id number]  Restrictions/Precautions:    Medical/Treatment Diagnosis Information:  · Diagnosis: S46.011D  traumatic RTC tear    s/p Double row supraspinatus repair, subscap repair, biceps tenodesis, labral debridement    DOS:  20  · Treatment Diagnosis: right shoulder pain B87.155  Insurance/Certification information:  PT Insurance Information: BCBS- $2500 deductible met  0 copay, 20 PT   (used 10)  Needs auth  Physician Information:  Referring Practitioner: Dr. Lucio Haro  Has the plan of care been signed (Y/N):        []  Yes  [x]  No     Date of Patient follow up with Physician: 20      Is this a Progress Report:     []  Yes  [x]  No        If Yes:  Date Range for reporting period:  Beginnin20  Ending:     Progress report will be due (10 Rx or 30 days whichever is less):        Recertification will be due (POC Duration  / 90 days whichever is less):        Visit # Insurance Allowable Auth Required   12 10  []  Yes []  No        Therapy Diagnosis/Practice Pattern:I      Number of Comorbidities:  [x]0     []1-2    []3+    Latex Allergy:  [x]NO      []YES  Preferred Language for Healthcare:   [x]English       []other:    SUBJECTIVE: Pt feels like ROM is improving. Pt would like to introduce more strengthening. OBJECTIVE: See eval   Observation:    Test measurements:    ROM Left Right   Shoulder Flex  140   Shoulder Abd     Shoulder ER  45   Shoulder IR     Elbow flex  WNL   Elbow ext  WNL   Strength  Left Right   Shoulder Flex  nt   Shoulder Scap  nt   Shoulder ER  nt   Shoulder IR  nt        Pain scale  2  /10   Quick dash   43 = 73%     RESTRICTIONS/PRECAUTIONS: history of bilat ACLs, achilles tendon repair.     [place]:  20  Over the next a month were going to focus on some gentle progression with his glenohumeral stretching. Beginning active assisted to independent engagement of his rotator cuff. No significant resistance or quick dynamic movements. Exercises/Interventions:      Therapeutic Ex (50964) Sets/sec Reps Notes/CUES          Cane flex/  Cane ER  x10  With bridge     Supine ER LLLD  3x  ;20      Supine post cap s :20  3x     Serratus punch 5# 2 x 10  hep   SL ERC 3# 2 x 10     SL abd to 90 2# 2 x 10    SL IR s :10  5x     Prone ext 3# X 20     Prone horiz 0#  x 20     Modified IR s :10 X 10    pulleys  X 20     Wall walks  X 10     TB  IR/ ER GR X 20     TB row/  TB ext GR x 20 GR x 20    True S  flex  X 10           CC row/    CC triceps 50# x 30 40# x 30     CC LPD  60# x 30            Manual Intervention (14127)      Joint mobs  Inf and post  6 min    sidelying scap mobs  5 min    PROM of shoulder. 10 min                      NMR re-education (62863)   CUES NEEDED                                   Therapeutic Exercise and NMR EXR  [x] (20742) Provided verbal/tactile cueing for activities related to strengthening, flexibility, endurance, ROM  for improvements in scapular, scapulothoracic and UE control with self care, reaching, carrying, lifting, house/yardwork, driving/computer work.    [] (09522) Provided verbal/tactile cueing for activities related to improving balance, coordination, kinesthetic sense, posture, motor skill, proprioception  to assist with  scapular, scapulothoracic and UE control with self care, reaching, carrying, lifting, house/yardwork, driving/computer work. Therapeutic Activities:    [] (01826 or 67317) Provided verbal/tactile cueing for activities related to improving balance, coordination, kinesthetic sense, posture, motor skill, proprioception and motor activation to allow for proper function of scapular, scapulothoracic and UE control with self care, carrying, lifting, driving/computer work.      Home Exercise Program:    [x] (12356) Reviewed/Progressed HEP activities related to strengthening, flexibility, endurance, ROM of scapular, scapulothoracic and UE control with self care, reaching, carrying, lifting, house/yardwork, driving/computer work  [] (10698) Reviewed/Progressed HEP activities related to improving balance, coordination, kinesthetic sense, posture, motor skill, proprioception of scapular, scapulothoracic and UE control with self care, reaching, carrying, lifting, house/yardwork, driving/computer work      Manual Treatments:  PROM / STM / Oscillations-Mobs:  G-I, II, III, IV (PA's, Inf., Post.)  [x] (36856) Provided manual therapy to mobilize soft tissue/joints of cervical/CT, scapular GHJ and UE for the purpose of modulating pain, promoting relaxation,  increasing ROM, reducing/eliminating soft tissue swelling/inflammation/restriction, improving soft tissue extensibility and allowing for proper ROM for normal function with self care, reaching, carrying, lifting, house/yardwork, driving/computer work    Modalities:      Charges:  Timed Code Treatment Minutes: 30   Total Treatment Minutes: 40       [] EVAL (LOW) 56606 (typically 20 minutes face-to-face)  [] EVAL (MOD) 07962 (typically 30 minutes face-to-face)  [] EVAL (HIGH) 10723 (typically 45 minutes face-to-face)  [] RE-EVAL     [x] UC(41227) x   1  [] IONTO  [] NMR (08952) x     [] VASO  [x] Manual (51180) x   1   [x] Other: ice  [] TA x      [] Mech Traction (66818)  [] ES(attended) (23847)      [] ES (un) (35552):     GOALS:  Patient stated goal: return to full function  []? Progressing: []? Met: []? Not Met: []? Adjusted     Therapist goals for Patient:   Short Term Goals: To be achieved in: 2 weeks  1. Independent in HEP and progression per patient tolerance, in order to prevent re-injury. []? Progressing: []? Met: []? Not Met: []? Adjusted  2.  Patient will have a decrease in pain to facilitate improvement in movement, function, and ADLs as indicated by Functional Deficits. []? Progressing: []? Met: []? Not Met: []? Adjusted     Long Term Goals: To be achieved in: 8 weeks  1. Disability index score of 30% or less for the [facility] to assist with reaching prior level of function. []? Progressing: []? Met: []? Not Met: []? Adjusted  2. Patient will demonstrate increased AROM to 150 flex, 90 ER, and 70 IR to allow for proper joint functioning as indicated by patients Functional Deficits. [x]? Progressing: []? Met: []? Not Met: []? Adjusted  3. Patient will demonstrate an increase in Strength to 4+/5 throughout the right UE to allow for proper functional mobility as indicated by patients Functional Deficits. []? Progressing: []? Met: []? Not Met: []? Adjusted  4. Patient will return to dressing, driving, combing hair with right hand without increased symptoms or restriction. [x]? Progressing: []? Met: []? Not Met: []? Adjusted  5. Participate in [facility] [place] for return to sport. []? Progressing: []? Met: []? Not Met: []? Adjusted          Progression Towards Functional goals:  [x] Patient is progressing as expected towards functional goals listed. [] Progression is slowed due to complexities listed. [] Progression has been slowed due to co-morbidities. [] Plan just implemented, too soon to assess goals progression  [] Other:     ASSESSMENT:   Pt feels crepitus under scapula. Add thoracic mobilization NV. Overall Progression Towards Functional goals/ Treatment Progress Update:  [x] Patient is progressing as expected towards functional goals listed. [] Progression is slowed due to complexities/Impairments listed. [] Progression has been slowed due to co-morbidities.   [] Plan just implemented, too soon to assess goals progression <30days   [] Goals require adjustment due to lack of progress  [] Patient is not progressing as expected and requires additional follow up with physician  [] Other    Prognosis for POC: [x] Good [] Fair  [] Poor      Patient requires continued skilled intervention: [x] Yes  [] No    Treatment/Activity Tolerance:  [x] Patient able to complete treatment  [] Patient limited by fatigue  [] Patient limited by pain    [] Patient limited by other medical complications  [] Other:         PLAN: See eval  [x] Continue per plan of care [] Alter current plan (see comments above)  [] Plan of care initiated [] Hold pending MD visit [] Discharge      Electronically signed by:  Nathan Cowan PT    Note: If patient does not return for scheduled/ recommended follow up visits, this note will serve as a discharge from care along with most recent update on progress.

## 2020-11-02 ENCOUNTER — HOSPITAL ENCOUNTER (OUTPATIENT)
Dept: PHYSICAL THERAPY | Age: 55
Setting detail: THERAPIES SERIES
Discharge: HOME | End: 2020-11-02
Payer: COMMERCIAL

## 2020-11-02 PROCEDURE — 97110 THERAPEUTIC EXERCISES: CPT | Performed by: PHYSICAL THERAPIST

## 2020-11-02 PROCEDURE — 97140 MANUAL THERAPY 1/> REGIONS: CPT | Performed by: PHYSICAL THERAPIST

## 2020-11-02 NOTE — FLOWSHEET NOTE
[facility], [address]  Phone: [phone]  Fax [phone]        Date:  2020    Patient Name:  Elsa Samayoa    :  1965  MRN: [id number]  Restrictions/Precautions:    Medical/Treatment Diagnosis Information:  · Diagnosis: S46.011D  traumatic RTC tear    s/p Double row supraspinatus repair, subscap repair, biceps tenodesis, labral debridement    DOS:  20  · Treatment Diagnosis: right shoulder pain K00.785  Insurance/Certification information:  PT Insurance Information: BCBS- $2500 deductible met  0 copay, 20 PT   (used 10)  Needs auth  Physician Information:  Referring Practitioner: Dr. Sara Cuellar  Has the plan of care been signed (Y/N):        []  Yes  [x]  No     Date of Patient follow up with Physician: 20      Is this a Progress Report:     []  Yes  [x]  No        If Yes:  Date Range for reporting period:  Beginnin20  Ending:     Progress report will be due (10 Rx or 30 days whichever is less):        Recertification will be due (POC Duration  / 90 days whichever is less):        Visit # Insurance Allowable Auth Required   13 10 /  New ins  X 20 []  Yes []  No        Therapy Diagnosis/Practice Pattern:I      Number of Comorbidities:  [x]0     []1-2    []3+    Latex Allergy:  [x]NO      []YES  Preferred Language for Healthcare:   [x]English       []other:    SUBJECTIVE: Pt cont to feel tight. Pt would like to switch to 2x a week. OBJECTIVE: See eval   Observation:    Test measurements:    ROM Left Right   Shoulder Flex  140   Shoulder Abd     Shoulder ER  45   Shoulder IR     Elbow flex  WNL   Elbow ext  WNL   Strength  Left Right   Shoulder Flex  nt   Shoulder Scap  nt   Shoulder ER  nt   Shoulder IR  nt        Pain scale  2  /10   Quick dash   43 = 73%     RESTRICTIONS/PRECAUTIONS: history of bilat ACLs, achilles tendon repair.     [place]:  20  Over the next a month were going to focus on some gentle progression with his glenohumeral stretching. Beginning active assisted to independent engagement of his rotator cuff. No significant resistance or quick dynamic movements. Exercises/Interventions:      Therapeutic Ex (69303) Sets/sec Reps Notes/CUES         Post cap self MA with tennis ball  2 min    Sleeper s :15 5x    Supine pec s :20  5x           Cane flex/  Cane ER  x10  With bridge     Supine ER LLLD  3x  ;20      Supine post cap s :20  3x     Serratus punch 5# 2 x 10  hep   SL ERC 3# 2 x 10        SL IR s :10  5x           Modified IR s :10 X 10    pulleys  X 20     Wall walks  X 10     TB  IR/ ER GR X 20     TB row/  TB ext GR x 20 GR x 20    True S  flex  X 10           CC row/    CC triceps 50# x 30 40# x 30     CC LPD  60# x 30            Manual Intervention (29031)      Joint mobs  Inf and post  4 min    sidelying scap mobs  5 min    PROM of shoulder. 10 min    Prone post cap STM  5 min                NMR re-education (55156)   CUES NEEDED                                   Therapeutic Exercise and NMR EXR  [x] (35561) Provided verbal/tactile cueing for activities related to strengthening, flexibility, endurance, ROM  for improvements in scapular, scapulothoracic and UE control with self care, reaching, carrying, lifting, house/yardwork, driving/computer work.    [] (91381) Provided verbal/tactile cueing for activities related to improving balance, coordination, kinesthetic sense, posture, motor skill, proprioception  to assist with  scapular, scapulothoracic and UE control with self care, reaching, carrying, lifting, house/yardwork, driving/computer work.     Therapeutic Activities:    [] (88429 or 87175) Provided verbal/tactile cueing for activities related to improving balance, coordination, kinesthetic sense, posture, motor skill, proprioception and motor activation to allow for proper function of scapular, scapulothoracic and UE control with self care, physician  [] Other    Prognosis for POC: [x] Good [] Fair  [] Poor      Patient requires continued skilled intervention: [x] Yes  [] No    Treatment/Activity Tolerance:  [x] Patient able to complete treatment  [] Patient limited by fatigue  [] Patient limited by pain    [] Patient limited by other medical complications  [] Other:         PLAN: See eval  [x] Continue per plan of care [] Alter current plan (see comments above)  [] Plan of care initiated [] Hold pending MD visit [] Discharge      Electronically signed by:  Andres Akbar PT    Note: If patient does not return for scheduled/ recommended follow up visits, this note will serve as a discharge from care along with most recent update on progress.

## 2020-11-06 ENCOUNTER — HOSPITAL ENCOUNTER (OUTPATIENT)
Dept: PHYSICAL THERAPY | Age: 55
Setting detail: THERAPIES SERIES
Discharge: HOME | End: 2020-11-06
Payer: COMMERCIAL

## 2020-11-06 PROCEDURE — 97110 THERAPEUTIC EXERCISES: CPT | Performed by: PHYSICAL THERAPIST

## 2020-11-06 PROCEDURE — 97140 MANUAL THERAPY 1/> REGIONS: CPT | Performed by: PHYSICAL THERAPIST

## 2020-11-09 ENCOUNTER — APPOINTMENT (OUTPATIENT)
Dept: PHYSICAL THERAPY | Age: 55
End: 2020-11-09
Payer: COMMERCIAL

## 2020-11-09 ENCOUNTER — HOSPITAL ENCOUNTER (OUTPATIENT)
Dept: PHYSICAL THERAPY | Age: 55
Setting detail: THERAPIES SERIES
Discharge: HOME | End: 2020-11-09
Payer: COMMERCIAL

## 2020-11-09 PROCEDURE — 97140 MANUAL THERAPY 1/> REGIONS: CPT | Performed by: PHYSICAL THERAPIST

## 2020-11-09 NOTE — FLOWSHEET NOTE
[facility],  [address]  Phone: [phone]  Fax [phone]        Date:  2020    Patient Name:  Cindy Collazo    :  1965  MRN: [id number]  Restrictions/Precautions:    Medical/Treatment Diagnosis Information:  · Diagnosis: S46.011D  traumatic RTC tear    s/p Double row supraspinatus repair, subscap repair, biceps tenodesis, labral debridement    DOS:  20  · Treatment Diagnosis: right shoulder pain O41.649  Insurance/Certification information:  PT Insurance Information: BCBS- $2500 deductible met  0 copay, 20 PT   (used 10)  Needs auth  Physician Information:  Referring Practitioner: Dr. Debora Castrejon  Has the plan of care been signed (Y/N):        []  Yes  [x]  No     Date of Patient follow up with Physician: 20      Is this a Progress Report:     []  Yes  [x]  No        If Yes:  Date Range for reporting period:  Beginnin20  Ending:     Progress report will be due (10 Rx or 30 days whichever is less):        Recertification will be due (POC Duration  / 90 days whichever is less):        Visit # Insurance Allowable Auth Required   15 10 /  New ins  X 20 []  Yes []  No         Therapy Diagnosis/Practice Pattern:I      Number of Comorbidities:  [x]0     []1-2    []3+    Latex Allergy:  [x]NO      []YES  Preferred Language for Healthcare:   [x]English       []other:    SUBJECTIVE: Pt has some discomfort reaching. Overall, ADLs are getting easier. OBJECTIVE: See eval   Observation:    Test measurements:    ROM Left Right   Shoulder Flex  140   Shoulder Abd     Shoulder ER  45   Shoulder IR     Elbow flex  WNL   Elbow ext  WNL   Strength  Left Right   Shoulder Flex  nt   Shoulder Scap  nt   Shoulder ER  nt   Shoulder IR  nt        Pain scale  2  /10   Quick dash   43 = 73%     RESTRICTIONS/PRECAUTIONS: history of bilat ACLs, achilles tendon repair.     [place]:  20  Over the next a month were going to focus on some gentle progression with his glenohumeral stretching. Beginning active assisted to independent engagement of his rotator cuff. No significant resistance or quick dynamic movements. Exercises/Interventions:      Therapeutic Ex (10783) Sets/sec Reps Notes/CUES         Post cap self MA with tennis ball  2 min    Sleeper s :15 5x    Supine pec s :20  5x           Cane flex/  Cane ER  x10  With bridge     Supine ER LLLD  3x  ;20      Supine post cap s :20  3x     Serratus punch 5# 2 x 10  hep   SL ERC 3# 2 x 10        SL IR s :10  5x     Prone ext 3# X 20        Modified IR s :10 X 10    pulleys  X 20     Wall walks  X 10     TB  IR/ ER GR X 20     TB row/  TB ext GR x 20 GR x 20    True S  flex  X 10           CC row/    CC triceps 50# x 30 40# x 30     CC LPD  60# x 30            Manual Intervention (61910)      Joint mobs  Inf and post  4 min    sidelying scap mobs  2 min    PROM of shoulder. 5 min    Prone post cap STM  5 min                NMR re-education (42238)   CUES NEEDED                                   Therapeutic Exercise and NMR EXR  [x] (48656) Provided verbal/tactile cueing for activities related to strengthening, flexibility, endurance, ROM  for improvements in scapular, scapulothoracic and UE control with self care, reaching, carrying, lifting, house/yardwork, driving/computer work.    [] (19470) Provided verbal/tactile cueing for activities related to improving balance, coordination, kinesthetic sense, posture, motor skill, proprioception  to assist with  scapular, scapulothoracic and UE control with self care, reaching, carrying, lifting, house/yardwork, driving/computer work.     Therapeutic Activities:    [] (19781 or 61965) Provided verbal/tactile cueing for activities related to improving balance, coordination, kinesthetic sense, posture, motor skill, proprioception and motor activation to allow for proper function of scapular, scapulothoracic and UE control with self care, carrying, lifting, driving/computer work. Home Exercise Program:    [x] (33381) Reviewed/Progressed HEP activities related to strengthening, flexibility, endurance, ROM of scapular, scapulothoracic and UE control with self care, reaching, carrying, lifting, house/yardwork, driving/computer work  [] (33155) Reviewed/Progressed HEP activities related to improving balance, coordination, kinesthetic sense, posture, motor skill, proprioception of scapular, scapulothoracic and UE control with self care, reaching, carrying, lifting, house/yardwork, driving/computer work      Manual Treatments:  PROM / STM / Oscillations-Mobs:  G-I, II, III, IV (PA's, Inf., Post.)  [x] (44875) Provided manual therapy to mobilize soft tissue/joints of cervical/CT, scapular GHJ and UE for the purpose of modulating pain, promoting relaxation,  increasing ROM, reducing/eliminating soft tissue swelling/inflammation/restriction, improving soft tissue extensibility and allowing for proper ROM for normal function with self care, reaching, carrying, lifting, house/yardwork, driving/computer work    Modalities:      Charges:  Timed Code Treatment Minutes: 20   Total Treatment Minutes: 20       [] EVAL (LOW) 27148 (typically 20 minutes face-to-face)  [] EVAL (MOD) 37541 (typically 30 minutes face-to-face)  [] EVAL (HIGH) 70796 (typically 45 minutes face-to-face)  [] RE-EVAL     [] PD(94319) x     [] IONTO  [] NMR (41059) x     [] VASO  [x] Manual (51528) x   1   [] Other: ice  [] TA x      [] Mech Traction (59790)  [] ES(attended) (30495)      [] ES (un) (99893):     GOALS:  Patient stated goal: return to full function  []? Progressing: []? Met: []? Not Met: []? Adjusted     Therapist goals for Patient:   Short Term Goals: To be achieved in: 2 weeks  1. Independent in HEP and progression per patient tolerance, in order to prevent re-injury. []? Progressing: []? Met: []? Not Met: []? Adjusted  2.  Patient will have a decrease in pain to facilitate improvement in movement, function, and ADLs as indicated by Functional Deficits. []? Progressing: []? Met: []? Not Met: []? Adjusted     Long Term Goals: To be achieved in: 8 weeks  1. Disability index score of 30% or less for the [facility] to assist with reaching prior level of function. []? Progressing: []? Met: []? Not Met: []? Adjusted  2. Patient will demonstrate increased AROM to 150 flex, 90 ER, and 70 IR to allow for proper joint functioning as indicated by patients Functional Deficits. [x]? Progressing: []? Met: []? Not Met: []? Adjusted  3. Patient will demonstrate an increase in Strength to 4+/5 throughout the right UE to allow for proper functional mobility as indicated by patients Functional Deficits. []? Progressing: []? Met: []? Not Met: []? Adjusted  4. Patient will return to dressing, driving, combing hair with right hand without increased symptoms or restriction. [x]? Progressing: []? Met: []? Not Met: []? Adjusted  5. Participate in [employer] for return to sport. []? Progressing: []? Met: []? Not Met: []? Adjusted          Progression Towards Functional goals:  [x] Patient is progressing as expected towards functional goals listed. [] Progression is slowed due to complexities listed. [] Progression has been slowed due to co-morbidities. [] Plan just implemented, too soon to assess goals progression  [] Other:     ASSESSMENT:   Focus on ROM. Progress to [employer] as joint motion improves. Overall Progression Towards Functional goals/ Treatment Progress Update:  [x] Patient is progressing as expected towards functional goals listed. [] Progression is slowed due to complexities/Impairments listed. [] Progression has been slowed due to co-morbidities.   [] Plan just implemented, too soon to assess goals progression <30days   [] Goals require adjustment due to lack of progress  [] Patient is not progressing as expected and requires additional follow up with physician  [] Other    Prognosis for POC: [x] Good [] Fair  [] Poor      Patient requires continued skilled intervention: [x] Yes  [] No    Treatment/Activity Tolerance:  [x] Patient able to complete treatment  [] Patient limited by fatigue  [] Patient limited by pain    [] Patient limited by other medical complications  [] Other:         PLAN: See eval  [x] Continue per plan of care [] Alter current plan (see comments above)  [] Plan of care initiated [] Hold pending MD visit [] Discharge      Electronically signed by:  Dipika Bergman PT    Note: If patient does not return for scheduled/ recommended follow up visits, this note will serve as a discharge from care along with most recent update on progress.

## 2020-11-11 ENCOUNTER — HOSPITAL ENCOUNTER (OUTPATIENT)
Dept: PHYSICAL THERAPY | Age: 55
Setting detail: THERAPIES SERIES
Discharge: HOME | End: 2020-11-11
Payer: COMMERCIAL

## 2020-11-11 PROCEDURE — 97140 MANUAL THERAPY 1/> REGIONS: CPT | Performed by: PHYSICAL THERAPIST

## 2020-11-11 NOTE — FLOWSHEET NOTE
[facility],  [address] [place]  Phone: [phone]  Fax [phone]        Date:  2020    Patient Name:  Rosita Pierre    :  1965  MRN: [id number]  Restrictions/Precautions:    Medical/Treatment Diagnosis Information:  · Diagnosis: S46.011D  traumatic RTC tear    s/p Double row supraspinatus repair, subscap repair, biceps tenodesis, labral debridement    DOS:  20  · Treatment Diagnosis: right shoulder pain X11.457  Insurance/Certification information:  PT Insurance Information: BCBS- $2500 deductible met  0 copay, 20 PT   (used 10)  Needs auth  Physician Information:  Referring Practitioner: Dr. Claude Peguero  Has the plan of care been signed (Y/N):        []  Yes  [x]  No     Date of Patient follow up with Physician: 20      Is this a Progress Report:     []  Yes  [x]  No        If Yes:  Date Range for reporting period:  Beginnin20  Ending:     Progress report will be due (10 Rx or 30 days whichever is less):        Recertification will be due (POC Duration  / 90 days whichever is less):        Visit # Insurance Allowable Auth Required   16 10 /  New ins  X 20 []  Yes []  No         Therapy Diagnosis/Practice Pattern:I      Number of Comorbidities:  [x]0     []1-2    []3+    Latex Allergy:  [x]NO      []YES  Preferred Language for Healthcare:   [x]English       []other:    SUBJECTIVE: Pt has some discomfort reaching. Overall, ADLs are getting easier. OBJECTIVE: See eval   Observation:    Test measurements:    ROM Left Right   Shoulder Flex  140   Shoulder Abd     Shoulder ER  45   Shoulder IR     Elbow flex  WNL   Elbow ext  WNL   Strength  Left Right   Shoulder Flex  nt   Shoulder Scap  nt   Shoulder ER  nt   Shoulder IR  nt        Pain scale  2  /10   Quick dash   43 = 73%     RESTRICTIONS/PRECAUTIONS: history of bilat ACLs, achilles tendon repair.     [place]:  20  Over the next a month were going to focus on some gentle progression with his glenohumeral stretching. Beginning active assisted to independent engagement of his rotator cuff. No significant resistance or quick dynamic movements. Exercises/Interventions:      Therapeutic Ex (85896) Sets/sec Reps Notes/CUES         Post cap self MA with tennis ball    Sleeper s    Supine pec s         Cane flex/  Cane ER    Supine ER LLLD     Supine post cap s    Serratus punch hep   SL ERC       SL IR s :10  5x     Prone ext 3# X 20        Modified IR s :10 X 10    pulleys  X 20     Wall walks  X 10     Wall push ups  X 20           True S  flex  X 10                       Manual Intervention (70062)      Joint mobs  Inf and post  4 min    sidelying scap mobs  2 min    PROM of shoulder. 5 min    Prone post cap STM  5 min                NMR re-education (78461)   CUES NEEDED                                   Therapeutic Exercise and NMR EXR  [x] (56066) Provided verbal/tactile cueing for activities related to strengthening, flexibility, endurance, ROM  for improvements in scapular, scapulothoracic and UE control with self care, reaching, carrying, lifting, house/yardwork, driving/computer work.    [] (20000) Provided verbal/tactile cueing for activities related to improving balance, coordination, kinesthetic sense, posture, motor skill, proprioception  to assist with  scapular, scapulothoracic and UE control with self care, reaching, carrying, lifting, house/yardwork, driving/computer work. Therapeutic Activities:    [] (33707 or 99846) Provided verbal/tactile cueing for activities related to improving balance, coordination, kinesthetic sense, posture, motor skill, proprioception and motor activation to allow for proper function of scapular, scapulothoracic and UE control with self care, carrying, lifting, driving/computer work.      Home Exercise Program:    [x] (33195) Reviewed/Progressed HEP activities related to strengthening, Term Goals: To be achieved in: 8 weeks  1. Disability index score of 30% or less for the [facility] to assist with reaching prior level of function. []? Progressing: []? Met: []? Not Met: []? Adjusted  2. Patient will demonstrate increased AROM to 150 flex, 90 ER, and 70 IR to allow for proper joint functioning as indicated by patients Functional Deficits. [x]? Progressing: []? Met: []? Not Met: []? Adjusted  3. Patient will demonstrate an increase in Strength to 4+/5 throughout the right UE to allow for proper functional mobility as indicated by patients Functional Deficits. []? Progressing: []? Met: []? Not Met: []? Adjusted  4. Patient will return to dressing, driving, combing hair with right hand without increased symptoms or restriction. [x]? Progressing: []? Met: []? Not Met: []? Adjusted  5. Participate in [employer] for return to sport. []? Progressing: []? Met: []? Not Met: []? Adjusted          Progression Towards Functional goals:  [x] Patient is progressing as expected towards functional goals listed. [] Progression is slowed due to complexities listed. [] Progression has been slowed due to co-morbidities. [] Plan just implemented, too soon to assess goals progression  [] Other:     ASSESSMENT:   Focus on ROM. Progress to [employer] as joint motion improves. Overall Progression Towards Functional goals/ Treatment Progress Update:  [x] Patient is progressing as expected towards functional goals listed. [] Progression is slowed due to complexities/Impairments listed. [] Progression has been slowed due to co-morbidities.   [] Plan just implemented, too soon to assess goals progression <30days   [] Goals require adjustment due to lack of progress  [] Patient is not progressing as expected and requires additional follow up with physician  [] Other    Prognosis for POC: [x] Good [] Fair  [] Poor      Patient requires continued skilled intervention: [x] Yes  [] No    Treatment/Activity Tolerance:  [x] Patient able to complete treatment  [] Patient limited by fatigue  [] Patient limited by pain    [] Patient limited by other medical complications  [] Other:         PLAN: See eval  [x] Continue per plan of care [] Alter current plan (see comments above)  [] Plan of care initiated [] Hold pending MD visit [] Discharge      Electronically signed by:  Carly Palomares PT    Note: If patient does not return for scheduled/ recommended follow up visits, this note will serve as a discharge from care along with most recent update on progress.

## 2020-11-16 ENCOUNTER — HOSPITAL ENCOUNTER (OUTPATIENT)
Dept: PHYSICAL THERAPY | Age: 55
Setting detail: THERAPIES SERIES
Discharge: HOME | End: 2020-11-16
Payer: COMMERCIAL

## 2020-11-16 PROCEDURE — 97140 MANUAL THERAPY 1/> REGIONS: CPT | Performed by: PHYSICAL THERAPIST

## 2020-11-16 NOTE — FLOWSHEET NOTE
[facility],  [address] [facility]  Phone: [phone]  Fax [phone]        Date:  2020    Patient Name:  Quincy Restrepo    :  1965  MRN: [id number]  Restrictions/Precautions:    Medical/Treatment Diagnosis Information:  · Diagnosis: S46.011D  traumatic RTC tear    s/p Double row supraspinatus repair, subscap repair, biceps tenodesis, labral debridement    DOS:  20  · Treatment Diagnosis: right shoulder pain V47.073  Insurance/Certification information:  PT Insurance Information: BCBS- $2500 deductible met  0 copay, 20 PT   (used 10)  Needs auth  Physician Information:  Referring Practitioner: Dr. Ji Pendleton  Has the plan of care been signed (Y/N):        []  Yes  [x]  No     Date of Patient follow up with Physician: 20      Is this a Progress Report:     []  Yes  [x]  No        If Yes:  Date Range for reporting period:  Beginnin20  Ending:     Progress report will be due (10 Rx or 30 days whichever is less):        Recertification will be due (POC Duration  / 90 days whichever is less):        Visit # Insurance Allowable Auth Required   17 10 /  New ins  X 20 []  Yes []  No         Therapy Diagnosis/Practice Pattern:I      Number of Comorbidities:  [x]0     []1-2    []3+    Latex Allergy:  [x]NO      []YES  Preferred Language for Healthcare:   [x]English       []other:    SUBJECTIVE: Pt went to the gym. Pt is sore after trying to use cable column for IR/ ER    OBJECTIVE: See eval   Observation:    Test measurements:    ROM Left Right   Shoulder Flex  140   Shoulder Abd     Shoulder ER  45   Shoulder IR     Elbow flex  WNL   Elbow ext  WNL   Strength  Left Right   Shoulder Flex  nt   Shoulder Scap  nt   Shoulder ER  nt   Shoulder IR  nt        Pain scale  2  /10   Quick dash   43 = 73%     RESTRICTIONS/PRECAUTIONS: history of bilat ACLs, achilles tendon repair.     Priscilla:  20  Over the next a month were going to focus on some gentle progression with his glenohumeral stretching. Beginning active assisted to independent engagement of his rotator cuff. No significant resistance or quick dynamic movements. Exercises/Interventions:      Therapeutic Ex (85334) Sets/sec Reps Notes/CUES         Post cap self MA with tennis ball    Sleeper s    Supine pec s         Cane flex/  Cane ER    Supine ER LLLD     Supine post cap s    Serratus punch hep   SL ERC       SL IR s :10  5x     Prone ext 3# X 20        Modified IR s :10 X 10    pulleys  X 20     Wall walks  X 10     Wall push ups  X 20           True S  flex  X 10                       Manual Intervention (30274)      Joint mobs  Inf and post  4 min    sidelying scap mobs  2 min    PROM of shoulder. 5 min    Prone post cap STM  5 min                NMR re-education (59588)   CUES NEEDED                                   Therapeutic Exercise and NMR EXR  [x] (23872) Provided verbal/tactile cueing for activities related to strengthening, flexibility, endurance, ROM  for improvements in scapular, scapulothoracic and UE control with self care, reaching, carrying, lifting, house/yardwork, driving/computer work.    [] (92780) Provided verbal/tactile cueing for activities related to improving balance, coordination, kinesthetic sense, posture, motor skill, proprioception  to assist with  scapular, scapulothoracic and UE control with self care, reaching, carrying, lifting, house/yardwork, driving/computer work. Therapeutic Activities:    [] (32564 or 14226) Provided verbal/tactile cueing for activities related to improving balance, coordination, kinesthetic sense, posture, motor skill, proprioception and motor activation to allow for proper function of scapular, scapulothoracic and UE control with self care, carrying, lifting, driving/computer work.      Home Exercise Program:    [x] (24389) Reviewed/Progressed HEP activities related to strengthening, Term Goals: To be achieved in: 8 weeks  1. Disability index score of 30% or less for the [facility] to assist with reaching prior level of function. []? Progressing: []? Met: []? Not Met: []? Adjusted  2. Patient will demonstrate increased AROM to 150 flex, 90 ER, and 70 IR to allow for proper joint functioning as indicated by patients Functional Deficits. [x]? Progressing: []? Met: []? Not Met: []? Adjusted  3. Patient will demonstrate an increase in Strength to 4+/5 throughout the right UE to allow for proper functional mobility as indicated by patients Functional Deficits. []? Progressing: []? Met: []? Not Met: []? Adjusted  4. Patient will return to dressing, driving, combing hair with right hand without increased symptoms or restriction. [x]? Progressing: []? Met: []? Not Met: []? Adjusted  5. Participate in [facility] for return to sport. []? Progressing: []? Met: []? Not Met: []? Adjusted          Progression Towards Functional goals:  [x] Patient is progressing as expected towards functional goals listed. [] Progression is slowed due to complexities listed. [] Progression has been slowed due to co-morbidities. [] Plan just implemented, too soon to assess goals progression  [] Other:     ASSESSMENT:   Focus on ROM. Progress to [facility] as joint motion improves. Overall Progression Towards Functional goals/ Treatment Progress Update:  [x] Patient is progressing as expected towards functional goals listed. [] Progression is slowed due to complexities/Impairments listed. [] Progression has been slowed due to co-morbidities.   [] Plan just implemented, too soon to assess goals progression <30days   [] Goals require adjustment due to lack of progress  [] Patient is not progressing as expected and requires additional follow up with physician  [] Other    Prognosis for POC: [x] Good [] Fair  [] Poor      Patient requires continued skilled intervention: [x] Yes  [] No    Treatment/Activity Tolerance:  [x]

## 2020-11-17 ENCOUNTER — OFFICE VISIT (OUTPATIENT)
Dept: ORTHOPEDIC SURGERY | Age: 55
End: 2020-11-17
Payer: COMMERCIAL

## 2020-11-17 VITALS — BODY MASS INDEX: 29.78 KG/M2 | HEIGHT: 70 IN | WEIGHT: 208 LBS

## 2020-11-17 PROCEDURE — 99213 OFFICE O/P EST LOW 20 MIN: CPT | Performed by: PHYSICIAN ASSISTANT

## 2020-11-17 NOTE — PROGRESS NOTES
Surgery: Arthroscopic repair of a large traumatic anterior superior rotator cuff tear and biceps tenodesis    Post-Op Month:  4    Chief Complaint:  Follow-up (RIGHT SHOULDER)      History of Present of Illness: Chacorta's been doing very well. Said pain is mild. Continued in physical therapy and attends 1-2 times a week. He feels that he has continued to make progress but it is moving slower than what he would like. He feels his range of motion is limiting how quickly he can get his strength back. He has not been needing to take any pain medication. He reports sleeping is still difficult on that right side but generally has improved. Review of Systems  Pertinent items are noted in HPI  Denies fever, chills, confusion, bowel/bladder active change. Review of systems reviewed from Patient History Form dated on October 6 and available in the patient's chart under the Media tab. Examination:  No gross deformities noted. On exam today his right shoulder forward flexion to 130, abduction to approximately 100. Internal rotation to back pocket. He has good biceps contour and contraction. Strength appears to be improving as well. Radiology:     None today    No orders of the defined types were placed in this encounter. Impression:  Progressing well      Treatment Plan: At this time, the patient continues to do well overall. We encouraged him to continue with his range of motion and strengthening exercises and physical therapy and with his home exercise program. He can start a gentle light resistance training program as well. We once again discussed the possibility of a follow-up MRI at approximately 6 months if he does not see much improvement. He will follow-up with us in approx 6wks          [facility] ([facility])        This dictation was performed with a verbal recognition program (DRAGON) and it was checked for errors.  It is possible that there are still dictated errors within this office note. If so, please bring any errors to my attention for an addendum. All efforts were made to ensure that this office note is accurate.

## 2020-11-20 ENCOUNTER — HOSPITAL ENCOUNTER (OUTPATIENT)
Dept: PHYSICAL THERAPY | Age: 55
Setting detail: THERAPIES SERIES
Discharge: HOME | End: 2020-11-20
Payer: COMMERCIAL

## 2020-11-20 PROCEDURE — 97140 MANUAL THERAPY 1/> REGIONS: CPT | Performed by: PHYSICAL THERAPIST

## 2020-11-20 NOTE — FLOWSHEET NOTE
[facility],  [address]  Phone: [phone]  Fax [phone]        Date:  2020    Patient Name:  Elle Arana    :  1965  MRN: [id number]  Restrictions/Precautions:    Medical/Treatment Diagnosis Information:  · Diagnosis: S46.011D  traumatic RTC tear    s/p Double row supraspinatus repair, subscap repair, biceps tenodesis, labral debridement    DOS:  20  · Treatment Diagnosis: right shoulder pain U83.019  Insurance/Certification information:  PT Insurance Information: BCBS- $2500 deductible met  0 copay, 20 PT   (used 10)  Needs auth  Physician Information:  Referring Practitioner: Dr. Shira Cuevas  Has the plan of care been signed (Y/N):        []  Yes  [x]  No     Date of Patient follow up with Physician: 20      Is this a Progress Report:     []  Yes  [x]  No        If Yes:  Date Range for reporting period:  Beginnin20  Ending:     Progress report will be due (10 Rx or 30 days whichever is less):        Recertification will be due (POC Duration  / 90 days whichever is less):        Visit # Insurance Allowable Auth Required   18 10 /  New ins  X 20 []  Yes []  No         Therapy Diagnosis/Practice Pattern:I      Number of Comorbidities:  [x]0     []1-2    []3+    Latex Allergy:  [x]NO      []YES  Preferred Language for Healthcare:   [x]English       []other:    SUBJECTIVE: Pt arrived late due to flat tire. Pt is frustrated with tightness in shoulder    OBJECTIVE: See eval   Observation: shoulder hike still present.   Firm end feel with PROM   Test measurements:    ROM Left Right   Shoulder Flex  140   Shoulder Abd     Shoulder ER  45   Shoulder IR     Elbow flex  WNL   Elbow ext  WNL   Strength  Left Right   Shoulder Flex  nt   Shoulder Scap  nt   Shoulder ER  nt   Shoulder IR  nt        Pain scale  2  /10   Quick dash   43 = 73%     RESTRICTIONS/PRECAUTIONS: history of bilat ACLs, achilles tendon repair. Priscilla:  9/8/20  Over the next a month were going to focus on some gentle progression with his glenohumeral stretching. Beginning active assisted to independent engagement of his rotator cuff. No significant resistance or quick dynamic movements. Exercises/Interventions:      Therapeutic Ex (92881) Sets/sec Reps Notes/CUES         Post cap self MA with tennis ball    Sleeper s    Supine pec s         Cane flex/  Cane ER    Supine ER LLLD     Supine post cap s    Serratus punch hep   SL ERC       SL IR s :10  5x     Prone ext 3# X 20        Modified IR s :10 X 10    pulleys  X 20     Wall walks  X 10     Wall push ups  X 20           True S  flex  X 10           CC row/    CC triceps 50# x 30 40# x 30     CC LPD  60# x 30            Manual Intervention (90666)      Joint mobs  Inf and post  4 min    sidelying scap mobs  2 min    PROM of shoulder. 5 min    Prone post cap STM  5 min                NMR re-education (69505)   CUES NEEDED                                   Therapeutic Exercise and NMR EXR  [x] (60115) Provided verbal/tactile cueing for activities related to strengthening, flexibility, endurance, ROM  for improvements in scapular, scapulothoracic and UE control with self care, reaching, carrying, lifting, house/yardwork, driving/computer work.    [] (62538) Provided verbal/tactile cueing for activities related to improving balance, coordination, kinesthetic sense, posture, motor skill, proprioception  to assist with  scapular, scapulothoracic and UE control with self care, reaching, carrying, lifting, house/yardwork, driving/computer work.     Therapeutic Activities:    [] (27688 or 78189) Provided verbal/tactile cueing for activities related to improving balance, coordination, kinesthetic sense, posture, motor skill, proprioception and motor activation to allow for proper function of scapular, scapulothoracic and UE control with self care, carrying, lifting, driving/computer work. Home Exercise Program:    [x] (48547) Reviewed/Progressed HEP activities related to strengthening, flexibility, endurance, ROM of scapular, scapulothoracic and UE control with self care, reaching, carrying, lifting, house/yardwork, driving/computer work  [] (97137) Reviewed/Progressed HEP activities related to improving balance, coordination, kinesthetic sense, posture, motor skill, proprioception of scapular, scapulothoracic and UE control with self care, reaching, carrying, lifting, house/yardwork, driving/computer work      Manual Treatments:  PROM / STM / Oscillations-Mobs:  G-I, II, III, IV (PA's, Inf., Post.)  [x] (35217) Provided manual therapy to mobilize soft tissue/joints of cervical/CT, scapular GHJ and UE for the purpose of modulating pain, promoting relaxation,  increasing ROM, reducing/eliminating soft tissue swelling/inflammation/restriction, improving soft tissue extensibility and allowing for proper ROM for normal function with self care, reaching, carrying, lifting, house/yardwork, driving/computer work    Modalities:      Charges:  Timed Code Treatment Minutes: 20   Total Treatment Minutes: 20       [] EVAL (LOW) 52952 (typically 20 minutes face-to-face)  [] EVAL (MOD) 92326 (typically 30 minutes face-to-face)  [] EVAL (HIGH) 74971 (typically 45 minutes face-to-face)  [] RE-EVAL     [] JW(25993) x     [] IONTO  [] NMR (94719) x     [] VASO  [x] Manual (80761) x   1   [] Other: ice  [] TA x      [] Mech Traction (42780)  [] ES(attended) (49949)      [] ES (un) (79179):     GOALS:  Patient stated goal: return to full function  []? Progressing: []? Met: []? Not Met: []? Adjusted     Therapist goals for Patient:   Short Term Goals: To be achieved in: 2 weeks  1. Independent in HEP and progression per patient tolerance, in order to prevent re-injury. []? Progressing: []? Met: []? Not Met: []? Adjusted  2.  Patient will have a decrease in pain to facilitate improvement in movement, function, and ADLs as indicated by Functional Deficits. []? Progressing: []? Met: []? Not Met: []? Adjusted     Long Term Goals: To be achieved in: 8 weeks  1. Disability index score of 30% or less for the [facility] to assist with reaching prior level of function. []? Progressing: []? Met: []? Not Met: []? Adjusted  2. Patient will demonstrate increased AROM to 150 flex, 90 ER, and 70 IR to allow for proper joint functioning as indicated by patients Functional Deficits. [x]? Progressing: []? Met: []? Not Met: []? Adjusted  3. Patient will demonstrate an increase in Strength to 4+/5 throughout the right UE to allow for proper functional mobility as indicated by patients Functional Deficits. []? Progressing: []? Met: []? Not Met: []? Adjusted  4. Patient will return to dressing, driving, combing hair with right hand without increased symptoms or restriction. [x]? Progressing: []? Met: []? Not Met: []? Adjusted  5. Participate in [facility] for return to sport. []? Progressing: []? Met: []? Not Met: []? Adjusted          Progression Towards Functional goals:  [x] Patient is progressing as expected towards functional goals listed. [] Progression is slowed due to complexities listed. [] Progression has been slowed due to co-morbidities. [] Plan just implemented, too soon to assess goals progression  [] Other:     ASSESSMENT:   Strengthening HEP was updated. Visit focused on ROM    Overall Progression Towards Functional goals/ Treatment Progress Update:  [x] Patient is progressing as expected towards functional goals listed. [] Progression is slowed due to complexities/Impairments listed. [] Progression has been slowed due to co-morbidities.   [] Plan just implemented, too soon to assess goals progression <30days   [] Goals require adjustment due to lack of progress  [] Patient is not progressing as expected and requires additional follow up with physician  [] Other    Prognosis for POC: [x] Good [] Fair  [] Poor      Patient requires continued skilled intervention: [x] Yes  [] No    Treatment/Activity Tolerance:  [x] Patient able to complete treatment  [] Patient limited by fatigue  [] Patient limited by pain    [] Patient limited by other medical complications  [] Other:         PLAN: See eval  [x] Continue per plan of care [] Alter current plan (see comments above)  [] Plan of care initiated [] Hold pending MD visit [] Discharge      Electronically signed by:  Jasen Mays PT    Note: If patient does not return for scheduled/ recommended follow up visits, this note will serve as a discharge from care along with most recent update on progress.

## 2020-11-24 ENCOUNTER — HOSPITAL ENCOUNTER (OUTPATIENT)
Dept: PHYSICAL THERAPY | Age: 55
Setting detail: THERAPIES SERIES
Discharge: HOME | End: 2020-11-24
Payer: COMMERCIAL

## 2020-11-24 PROCEDURE — 97140 MANUAL THERAPY 1/> REGIONS: CPT | Performed by: PHYSICAL THERAPIST

## 2020-11-24 NOTE — FLOWSHEET NOTE
[facility],  [address] [place]  Phone: [phone]  Fax [phone]        Date:  2020    Patient Name:  Ryley Dalton    :  1965  MRN: [id number]  Restrictions/Precautions:    Medical/Treatment Diagnosis Information:  · Diagnosis: S46.011D  traumatic RTC tear    s/p Double row supraspinatus repair, subscap repair, biceps tenodesis, labral debridement    DOS:  20  · Treatment Diagnosis: right shoulder pain D77.869  Insurance/Certification information:  PT Insurance Information: BCBS- $2500 deductible met  0 copay, 20 PT   (used 10)  Needs auth  Physician Information:  Referring Practitioner: Dr. Husam Khoury  Has the plan of care been signed (Y/N):        []  Yes  [x]  No     Date of Patient follow up with Physician: 20      Is this a Progress Report:     []  Yes  [x]  No        If Yes:  Date Range for reporting period:  Beginnin20  Ending:     Progress report will be due (10 Rx or 30 days whichever is less):        Recertification will be due (POC Duration  / 90 days whichever is less):        Visit # Insurance Allowable Auth Required   19 10 /  New ins  X 20 []  Yes []  No         Therapy Diagnosis/Practice Pattern:I      Number of Comorbidities:  [x]0     []1-2    []3+    Latex Allergy:  [x]NO      []YES  Preferred Language for Healthcare:   [x]English       []other:    SUBJECTIVE: Pt reports great compliance with exercises. Still feels pinch with full flex. Pt is able to do light household work without pain. Pt is able to sleep on right side. OBJECTIVE: See eval   Observation: shoulder hike still present.   Firm end feel with PROM   Test measurements:    ROM Left Right   Shoulder Flex  140   Shoulder Abd     Shoulder ER  45   Shoulder IR     Elbow flex  WNL   Elbow ext  WNL   Strength  Left Right   Shoulder Flex  nt   Shoulder Scap  nt   Shoulder ER  nt   Shoulder IR  nt Pain scale  2  /10   Quick dash   43 = 73%     RESTRICTIONS/PRECAUTIONS: history of bilat ACLs, achilles tendon repair. [place]:  9/8/20  Over the next a month were going to focus on some gentle progression with his glenohumeral stretching. Beginning active assisted to independent engagement of his rotator cuff. No significant resistance or quick dynamic movements. Exercises/Interventions:      Therapeutic Ex (35470) Sets/sec Reps Notes/CUES         Sleeper s    Supine pec s    Supine post cap s    Serratus punch hep   Supine R/S  2 directions    Supine horizontal abd RDX 20    Supine PNF D2 RDX 20    SL ERC             Modified IR s :10 X 10    pulleys  X 20     Wall walks  X 10     Wall push ups  X 20     TB punch GR X 30     TB  IR/ ER GR X 20        True S  flex  X 10           CC row/    CC triceps 50# x 30 40# x 30     CC LPD  60# x 30            Manual Intervention (82319)      Joint mobs  Inf and post  4 min    sidelying scap mobs  2 min    PROM of shoulder. 5 min                   NMR re-education (00160)   CUES NEEDED                                   Therapeutic Exercise and NMR EXR  [x] (62319) Provided verbal/tactile cueing for activities related to strengthening, flexibility, endurance, ROM  for improvements in scapular, scapulothoracic and UE control with self care, reaching, carrying, lifting, house/yardwork, driving/computer work.    [] (40011) Provided verbal/tactile cueing for activities related to improving balance, coordination, kinesthetic sense, posture, motor skill, proprioception  to assist with  scapular, scapulothoracic and UE control with self care, reaching, carrying, lifting, house/yardwork, driving/computer work.     Therapeutic Activities:    [] (07100 or 53678) Provided verbal/tactile cueing for activities related to improving balance, coordination, kinesthetic sense, posture, motor skill, proprioception and motor activation to allow for proper function of scapular, scapulothoracic and UE control with self care, carrying, lifting, driving/computer work. Home Exercise Program:    [x] (64276) Reviewed/Progressed HEP activities related to strengthening, flexibility, endurance, ROM of scapular, scapulothoracic and UE control with self care, reaching, carrying, lifting, house/yardwork, driving/computer work  [] (48728) Reviewed/Progressed HEP activities related to improving balance, coordination, kinesthetic sense, posture, motor skill, proprioception of scapular, scapulothoracic and UE control with self care, reaching, carrying, lifting, house/yardwork, driving/computer work      Manual Treatments:  PROM / STM / Oscillations-Mobs:  G-I, II, III, IV (PA's, Inf., Post.)  [x] (47834) Provided manual therapy to mobilize soft tissue/joints of cervical/CT, scapular GHJ and UE for the purpose of modulating pain, promoting relaxation,  increasing ROM, reducing/eliminating soft tissue swelling/inflammation/restriction, improving soft tissue extensibility and allowing for proper ROM for normal function with self care, reaching, carrying, lifting, house/yardwork, driving/computer work    Modalities:      Charges:  Timed Code Treatment Minutes: 20   Total Treatment Minutes: 20       [] EVAL (LOW) 82076 (typically 20 minutes face-to-face)  [] EVAL (MOD) 84603 (typically 30 minutes face-to-face)  [] EVAL (HIGH) 32655 (typically 45 minutes face-to-face)  [] RE-EVAL     [] QS(35217) x     [] IONTO  [] NMR (48615) x     [] VASO  [x] Manual (23912) x   1   [] Other: ice  [] TA x      [] Mech Traction (73824)  [] ES(attended) (01490)      [] ES (un) (50026):     GOALS:  Patient stated goal: return to full function  []? Progressing: []? Met: []? Not Met: []? Adjusted     Therapist goals for Patient:   Short Term Goals: To be achieved in: 2 weeks  1. Independent in HEP and progression per patient tolerance, in order to prevent re-injury. []? Progressing: []? Met: []? Not Met: []? Adjusted  2. Patient will have a decrease in pain to facilitate improvement in movement, function, and ADLs as indicated by Functional Deficits. []? Progressing: []? Met: []? Not Met: []? Adjusted     Long Term Goals: To be achieved in: 8 weeks  1. Disability index score of 30% or less for the [facility] to assist with reaching prior level of function. []? Progressing: []? Met: []? Not Met: []? Adjusted  2. Patient will demonstrate increased AROM to 150 flex, 90 ER, and 70 IR to allow for proper joint functioning as indicated by patients Functional Deficits. [x]? Progressing: []? Met: []? Not Met: []? Adjusted  3. Patient will demonstrate an increase in Strength to 4+/5 throughout the right UE to allow for proper functional mobility as indicated by patients Functional Deficits. []? Progressing: []? Met: []? Not Met: []? Adjusted  4. Patient will return to dressing, driving, combing hair with right hand without increased symptoms or restriction. [x]? Progressing: []? Met: []? Not Met: []? Adjusted  5. Participate in [facility] for return to sport. []? Progressing: []? Met: []? Not Met: []? Adjusted          Progression Towards Functional goals:  [x] Patient is progressing as expected towards functional goals listed. [] Progression is slowed due to complexities listed. [] Progression has been slowed due to co-morbidities. [] Plan just implemented, too soon to assess goals progression  [] Other:     ASSESSMENT:   Strengthening HEP was updated. Visit focused on ROM     Overall Progression Towards Functional goals/ Treatment Progress Update:  [x] Patient is progressing as expected towards functional goals listed. [] Progression is slowed due to complexities/Impairments listed. [] Progression has been slowed due to co-morbidities.   [] Plan just implemented, too soon to assess goals progression <30days   [] Goals require adjustment due to lack of progress  [] Patient is not progressing as expected and requires additional follow up with physician  [] Other    Prognosis for POC: [x] Good [] Fair  [] Poor      Patient requires continued skilled intervention: [x] Yes  [] No    Treatment/Activity Tolerance:  [x] Patient able to complete treatment  [] Patient limited by fatigue  [] Patient limited by pain    [] Patient limited by other medical complications  [] Other:         PLAN: See eval  [x] Continue per plan of care [] Alter current plan (see comments above)  [] Plan of care initiated [] Hold pending MD visit [] Discharge      Electronically signed by:  Jess Lee PT    Note: If patient does not return for scheduled/ recommended follow up visits, this note will serve as a discharge from care along with most recent update on progress.

## 2020-11-30 ENCOUNTER — HOSPITAL ENCOUNTER (OUTPATIENT)
Dept: PHYSICAL THERAPY | Age: 55
Setting detail: THERAPIES SERIES
Discharge: HOME | End: 2020-11-30
Payer: COMMERCIAL

## 2020-11-30 PROCEDURE — 97140 MANUAL THERAPY 1/> REGIONS: CPT | Performed by: PHYSICAL THERAPIST

## 2020-11-30 NOTE — FLOWSHEET NOTE
[facility],  [address] [place]  Phone: [phone]  Fax [phone]        Date:  2020    Patient Name:  Marty Fishman    :  1965  MRN: [id number]  Restrictions/Precautions:    Medical/Treatment Diagnosis Information:  · Diagnosis: S46.011D  traumatic RTC tear    s/p Double row supraspinatus repair, subscap repair, biceps tenodesis, labral debridement    DOS:  20  · Treatment Diagnosis: right shoulder pain O68.291  Insurance/Certification information:  PT Insurance Information: BCBS- $2500 deductible met  0 copay, 20 PT   (used 10)  Needs auth  Physician Information:  Referring Practitioner: Dr. Rahul Barlow  Has the plan of care been signed (Y/N):        []  Yes  [x]  No     Date of Patient follow up with Physician: 20      Is this a Progress Report:     []  Yes  [x]  No        If Yes:  Date Range for reporting period:  Beginnin20  Ending:     Progress report will be due (10 Rx or 30 days whichever is less):        Recertification will be due (POC Duration  / 90 days whichever is less):        Visit # Insurance Allowable Auth Required   20 10 /  New ins  X 20 []  Yes []  No         Therapy Diagnosis/Practice Pattern:I      Number of Comorbidities:  [x]0     []1-2    []3+    Latex Allergy:  [x]NO      []YES  Preferred Language for Healthcare:   [x]English       []other:    SUBJECTIVE: Pt has little pain during the day. He does feel a \"pinch\" when lifting/ reaching overhead. OBJECTIVE: See eval   Observation: shoulder hike still present.   Firm end feel with PROM   Test measurements:    ROM Left Right   Shoulder Flex  140   Shoulder Abd     Shoulder ER  45   Shoulder IR     Elbow flex  WNL   Elbow ext  WNL   Strength  Left Right   Shoulder Flex  nt   Shoulder Scap  nt   Shoulder ER  nt   Shoulder IR  nt        Pain scale  2  /10   Quick dash   43 = 73%     RESTRICTIONS/PRECAUTIONS: history of bilat ACLs, achilles tendon repair. [place]:  9/8/20  Over the next a month were going to focus on some gentle progression with his glenohumeral stretching. Beginning active assisted to independent engagement of his rotator cuff. No significant resistance or quick dynamic movements. Exercises/Interventions:      Therapeutic Ex (85525) Sets/sec Reps Notes/CUES         Sleeper s    Supine pec s    Supine post cap s    Serratus punch hep   Supine R/S  2 directions    Supine horizontal abd RDX 20    Supine PNF D2 RDX 20    SL ERC             Modified IR s :10 X 10    pulleys  X 20     Wall walks  X 10     Wall push ups  X 20     TB punch GR X 30     TB  IR/ ER GR X 20        True S  flex  X 10     Eccentric lowering flex/  scap 3# x 5 3# x 5    CC row/    CC triceps 50# x 30 40# x 30     CC LPD  60# x 30            Manual Intervention (90450)      Joint mobs  Inf and post  4 min    sidelying scap mobs  2 min    PROM of shoulder. 5 min                   NMR re-education (09958)   CUES NEEDED                                   Therapeutic Exercise and NMR EXR  [x] (14304) Provided verbal/tactile cueing for activities related to strengthening, flexibility, endurance, ROM  for improvements in scapular, scapulothoracic and UE control with self care, reaching, carrying, lifting, house/yardwork, driving/computer work.    [] (07934) Provided verbal/tactile cueing for activities related to improving balance, coordination, kinesthetic sense, posture, motor skill, proprioception  to assist with  scapular, scapulothoracic and UE control with self care, reaching, carrying, lifting, house/yardwork, driving/computer work.     Therapeutic Activities:    [] (50573 or 59506) Provided verbal/tactile cueing for activities related to improving balance, coordination, kinesthetic sense, posture, motor skill, proprioception and motor activation to allow for proper function of scapular, scapulothoracic and UE control with self care, carrying, lifting, driving/computer work. Home Exercise Program:    [x] (08125) Reviewed/Progressed HEP activities related to strengthening, flexibility, endurance, ROM of scapular, scapulothoracic and UE control with self care, reaching, carrying, lifting, house/yardwork, driving/computer work  [] (70206) Reviewed/Progressed HEP activities related to improving balance, coordination, kinesthetic sense, posture, motor skill, proprioception of scapular, scapulothoracic and UE control with self care, reaching, carrying, lifting, house/yardwork, driving/computer work      Manual Treatments:  PROM / STM / Oscillations-Mobs:  G-I, II, III, IV (PA's, Inf., Post.)  [x] (50348) Provided manual therapy to mobilize soft tissue/joints of cervical/CT, scapular GHJ and UE for the purpose of modulating pain, promoting relaxation,  increasing ROM, reducing/eliminating soft tissue swelling/inflammation/restriction, improving soft tissue extensibility and allowing for proper ROM for normal function with self care, reaching, carrying, lifting, house/yardwork, driving/computer work    Modalities:      Charges:  Timed Code Treatment Minutes: 20   Total Treatment Minutes: 20       [] EVAL (LOW) 89830 (typically 20 minutes face-to-face)  [] EVAL (MOD) 04848 (typically 30 minutes face-to-face)  [] EVAL (HIGH) 92549 (typically 45 minutes face-to-face)  [] RE-EVAL     [] TK(12935) x     [] IONTO  [] NMR (42635) x     [] VASO  [x] Manual (77400) x   1   [] Other: ice  [] TA x      [] Mech Traction (92615)  [] ES(attended) (02134)      [] ES (un) (87516):     GOALS:  Patient stated goal: return to full function  []? Progressing: []? Met: []? Not Met: []? Adjusted     Therapist goals for Patient:   Short Term Goals: To be achieved in: 2 weeks  1. Independent in HEP and progression per patient tolerance, in order to prevent re-injury. []? Progressing: []? Met: []? Not Met: []? Adjusted  2.  Patient will have a decrease in pain to facilitate improvement in movement, function, and ADLs as indicated by Functional Deficits. []? Progressing: []? Met: []? Not Met: []? Adjusted     Long Term Goals: To be achieved in: 8 weeks  1. Disability index score of 30% or less for the [facility] to assist with reaching prior level of function. []? Progressing: []? Met: []? Not Met: []? Adjusted  2. Patient will demonstrate increased AROM to 150 flex, 90 ER, and 70 IR to allow for proper joint functioning as indicated by patients Functional Deficits. [x]? Progressing: []? Met: []? Not Met: []? Adjusted  3. Patient will demonstrate an increase in Strength to 4+/5 throughout the right UE to allow for proper functional mobility as indicated by patients Functional Deficits. []? Progressing: []? Met: []? Not Met: []? Adjusted  4. Patient will return to dressing, driving, combing hair with right hand without increased symptoms or restriction. [x]? Progressing: []? Met: []? Not Met: []? Adjusted  5. Participate in [employer] for return to sport. []? Progressing: []? Met: []? Not Met: []? Adjusted          Progression Towards Functional goals:  [x] Patient is progressing as expected towards functional goals listed. [] Progression is slowed due to complexities listed. [] Progression has been slowed due to co-morbidities. [] Plan just implemented, too soon to assess goals progression  [] Other:     ASSESSMENT:   Shrug sign is lessening with flex. Still present with abd. Overall Progression Towards Functional goals/ Treatment Progress Update:  [x] Patient is progressing as expected towards functional goals listed. [] Progression is slowed due to complexities/Impairments listed. [] Progression has been slowed due to co-morbidities.   [] Plan just implemented, too soon to assess goals progression <30days   [] Goals require adjustment due to lack of progress  [] Patient is not progressing as expected and requires additional follow up with physician  []

## 2020-12-02 ENCOUNTER — HOSPITAL ENCOUNTER (OUTPATIENT)
Dept: PHYSICAL THERAPY | Age: 55
Setting detail: THERAPIES SERIES
Discharge: HOME | End: 2020-12-02
Payer: COMMERCIAL

## 2020-12-02 PROCEDURE — 97140 MANUAL THERAPY 1/> REGIONS: CPT | Performed by: PHYSICAL THERAPIST

## 2020-12-02 NOTE — FLOWSHEET NOTE
[facility],  [address]  Phone: [phone]  Fax [phone]        Date:  2020    Patient Name:  Gautam Palencia    :  1965  MRN: [id number]  Restrictions/Precautions:    Medical/Treatment Diagnosis Information:  · Diagnosis: S46.011D  traumatic RTC tear    s/p Double row supraspinatus repair, subscap repair, biceps tenodesis, labral debridement    DOS:  20  · Treatment Diagnosis: right shoulder pain M00.252  Insurance/Certification information:  PT Insurance Information: BCBS- $2500 deductible met  0 copay, 20 PT   (used 10)  Needs auth  Physician Information:  Referring Practitioner: Dr. Darian Tena  Has the plan of care been signed (Y/N):        []  Yes  [x]  No     Date of Patient follow up with Physician: 20      Is this a Progress Report:     []  Yes  [x]  No        If Yes:  Date Range for reporting period:  Beginnin20  Ending:     Progress report will be due (10 Rx or 30 days whichever is less):        Recertification will be due (POC Duration  / 90 days whichever is less):        Visit # Insurance Allowable Auth Required   21 10 /  New ins  X 20 []  Yes []  No         Therapy Diagnosis/Practice Pattern:I      Number of Comorbidities:  [x]0     []1-2    []3+    Latex Allergy:  [x]NO      []YES  Preferred Language for Healthcare:   [x]English       []other:    SUBJECTIVE:     OBJECTIVE: See eval   Observation: shoulder hike still present. Firm end feel with PROM   Test measurements:    ROM Left Right   Shoulder Flex  140   Shoulder Abd     Shoulder ER  45   Shoulder IR     Elbow flex  WNL   Elbow ext  WNL   Strength  Left Right   Shoulder Flex  nt   Shoulder Scap  nt   Shoulder ER  nt   Shoulder IR  nt        Pain scale  1.76  /10   Quick dash   43 = 73%     RESTRICTIONS/PRECAUTIONS: history of bilat ACLs, achilles tendon repair.     [place]:  20  Over the next a month were going to focus on some gentle progression with his glenohumeral stretching. Beginning active assisted to independent engagement of his rotator cuff. No significant resistance or quick dynamic movements. Exercises/Interventions:      Therapeutic Ex (12733) Sets/sec Reps Notes/CUES         Sleeper s    Supine pec s    Supine post cap s    Serratus punch hep   Supine R/S  2 directions    Supine horizontal abd RDX 20    Supine PNF D2 RDX 20    SL ERC             Modified IR s :10 X 10    pulleys  X 20     Wall walks  X 10     Table  push ups  X 20     TB punch GR X 30     TB  IR/ ER GR X 20        True S  flex  X 10     Eccentric lowering flex/  scap 3# x 5 3# x 5    CC row/    CC triceps 50# x 30 40# x 30     CC LPD  60# x 30            Manual Intervention (76739)      Joint mobs  Inf and post  4 min    sidelying scap mobs  2 min    PROM of shoulder. 5 min                   NMR re-education (02681)   CUES NEEDED                                   Therapeutic Exercise and NMR EXR  [x] (57415) Provided verbal/tactile cueing for activities related to strengthening, flexibility, endurance, ROM  for improvements in scapular, scapulothoracic and UE control with self care, reaching, carrying, lifting, house/yardwork, driving/computer work.    [] (47187) Provided verbal/tactile cueing for activities related to improving balance, coordination, kinesthetic sense, posture, motor skill, proprioception  to assist with  scapular, scapulothoracic and UE control with self care, reaching, carrying, lifting, house/yardwork, driving/computer work. Therapeutic Activities:    [] (65789 or 05162) Provided verbal/tactile cueing for activities related to improving balance, coordination, kinesthetic sense, posture, motor skill, proprioception and motor activation to allow for proper function of scapular, scapulothoracic and UE control with self care, carrying, lifting, driving/computer work.      Home Exercise Program:    [x] (65473) Reviewed/Progressed HEP activities related to strengthening, flexibility, endurance, ROM of scapular, scapulothoracic and UE control with self care, reaching, carrying, lifting, house/yardwork, driving/computer work  [] (16580) Reviewed/Progressed HEP activities related to improving balance, coordination, kinesthetic sense, posture, motor skill, proprioception of scapular, scapulothoracic and UE control with self care, reaching, carrying, lifting, house/yardwork, driving/computer work      Manual Treatments:  PROM / STM / Oscillations-Mobs:  G-I, II, III, IV (PA's, Inf., Post.)  [x] (40843) Provided manual therapy to mobilize soft tissue/joints of cervical/CT, scapular GHJ and UE for the purpose of modulating pain, promoting relaxation,  increasing ROM, reducing/eliminating soft tissue swelling/inflammation/restriction, improving soft tissue extensibility and allowing for proper ROM for normal function with self care, reaching, carrying, lifting, house/yardwork, driving/computer work    Modalities:      Charges:  Timed Code Treatment Minutes: 20   Total Treatment Minutes: 20       [] EVAL (LOW) 44242 (typically 20 minutes face-to-face)  [] EVAL (MOD) 32577 (typically 30 minutes face-to-face)  [] EVAL (HIGH) 86332 (typically 45 minutes face-to-face)  [] RE-EVAL     [] CA(05584) x     [] IONTO  [] NMR (69992) x     [] VASO  [x] Manual (58154) x   1   [] Other: ice  [] TA x      [] Mech Traction (37596)  [] ES(attended) (93183)      [] ES (un) (27673):     GOALS:  Patient stated goal: return to full function  []? Progressing: []? Met: []? Not Met: []? Adjusted     Therapist goals for Patient:   Short Term Goals: To be achieved in: 2 weeks  1. Independent in HEP and progression per patient tolerance, in order to prevent re-injury. []? Progressing: []? Met: []? Not Met: []? Adjusted  2.  Patient will have a decrease in pain to facilitate improvement in movement, function, and ADLs as indicated by Functional

## 2020-12-07 ENCOUNTER — HOSPITAL ENCOUNTER (OUTPATIENT)
Dept: PHYSICAL THERAPY | Age: 55
Setting detail: THERAPIES SERIES
Discharge: HOME | End: 2020-12-07
Payer: COMMERCIAL

## 2020-12-07 PROCEDURE — 97140 MANUAL THERAPY 1/> REGIONS: CPT | Performed by: PHYSICAL THERAPIST

## 2020-12-07 NOTE — FLOWSHEET NOTE
[facility],  [address] [place]  Phone: [phone]  Fax [phone]        Date:  2020    Patient Name:  Nat Mccormick    :  1965  MRN: [id number]  Restrictions/Precautions:    Medical/Treatment Diagnosis Information:  · Diagnosis: S46.011D  traumatic RTC tear    s/p Double row supraspinatus repair, subscap repair, biceps tenodesis, labral debridement    DOS:  20  · Treatment Diagnosis: right shoulder pain G46.987  Insurance/Certification information:  PT Insurance Information: BCBS- $2500 deductible met  0 copay, 20 PT   (used 10)  Needs auth  Physician Information:  Referring Practitioner: Dr. Yaz Soto  Has the plan of care been signed (Y/N):        []  Yes  [x]  No     Date of Patient follow up with Physician: 20      Is this a Progress Report:     []  Yes  [x]  No        If Yes:  Date Range for reporting period:  Beginnin20  Ending:     Progress report will be due (10 Rx or 30 days whichever is less):        Recertification will be due (POC Duration  / 90 days whichever is less):        Visit # Insurance Allowable Auth Required    10 /  New ins  X 20 []  Yes []  No         Therapy Diagnosis/Practice Pattern:I      Number of Comorbidities:  [x]0     []1-2    []3+    Latex Allergy:  [x]NO      []YES  Preferred Language for Healthcare:   [x]English       []other:    SUBJECTIVE:  Shoulder does not wake him from sleep. Reaching behind back is limited. Highest level of pain over the weekend was 0/10     OBJECTIVE: See eval   Observation: shoulder hike still present.   Firm end feel with PROM   Test measurements:    ROM Left Right   Shoulder Flex  140   Shoulder Abd     Shoulder ER  45   Shoulder IR     Elbow flex  WNL   Elbow ext  WNL   Strength  Left Right   Shoulder Flex  nt   Shoulder Scap  nt   Shoulder ER  nt   Shoulder IR  nt        Pain scale  0 /10   Quick dash   43 = 73% RESTRICTIONS/PRECAUTIONS: history of bilat ACLs, achilles tendon repair. [place]:  9/8/20  Over the next a month were going to focus on some gentle progression with his glenohumeral stretching. Beginning active assisted to independent engagement of his rotator cuff. No significant resistance or quick dynamic movements. Exercises/Interventions:      Therapeutic Ex (62736) Sets/sec Reps Notes/CUES         Sleeper s    Supine pec s    Supine post cap s    Serratus punch hep   Supine R/S  2 directions    Supine horizontal abd    Supine PNF D2    SL ERC             Modified IR s :10 X 10    pulleys  X 20     Wall walks    Table  push ups    TB punch GR X 30     TB  IR/ ER GR X 20        True S  flex  X 10     Eccentric lowering flex/  scap 3# x 5 3# x 5    CC row/    CC triceps 50# x 30 40# x 30     CC LPD  60# x 30            Manual Intervention (02760)      Joint mobs  Inf and post  4 min    sidelying scap mobs  2 min    PROM of shoulder. 5 min                   NMR re-education (75935)   CUES NEEDED                                   Therapeutic Exercise and NMR EXR  [x] (74146) Provided verbal/tactile cueing for activities related to strengthening, flexibility, endurance, ROM  for improvements in scapular, scapulothoracic and UE control with self care, reaching, carrying, lifting, house/yardwork, driving/computer work.    [] (44737) Provided verbal/tactile cueing for activities related to improving balance, coordination, kinesthetic sense, posture, motor skill, proprioception  to assist with  scapular, scapulothoracic and UE control with self care, reaching, carrying, lifting, house/yardwork, driving/computer work.     Therapeutic Activities:    [] (40895 or 93817) Provided verbal/tactile cueing for activities related to improving balance, coordination, kinesthetic sense, posture, motor skill, proprioception and motor activation to allow for proper function of scapular, scapulothoracic and UE control with self care, carrying, lifting, driving/computer work. Home Exercise Program:    [x] (23150) Reviewed/Progressed HEP activities related to strengthening, flexibility, endurance, ROM of scapular, scapulothoracic and UE control with self care, reaching, carrying, lifting, house/yardwork, driving/computer work  [] (76380) Reviewed/Progressed HEP activities related to improving balance, coordination, kinesthetic sense, posture, motor skill, proprioception of scapular, scapulothoracic and UE control with self care, reaching, carrying, lifting, house/yardwork, driving/computer work      Manual Treatments:  PROM / STM / Oscillations-Mobs:  G-I, II, III, IV (PA's, Inf., Post.)  [x] (24403) Provided manual therapy to mobilize soft tissue/joints of cervical/CT, scapular GHJ and UE for the purpose of modulating pain, promoting relaxation,  increasing ROM, reducing/eliminating soft tissue swelling/inflammation/restriction, improving soft tissue extensibility and allowing for proper ROM for normal function with self care, reaching, carrying, lifting, house/yardwork, driving/computer work    Modalities:      Charges:  Timed Code Treatment Minutes: 20   Total Treatment Minutes: 20       [] EVAL (LOW) 71591 (typically 20 minutes face-to-face)  [] EVAL (MOD) 49107 (typically 30 minutes face-to-face)  [] EVAL (HIGH) 05557 (typically 45 minutes face-to-face)  [] RE-EVAL     [] PP(78995) x     [] IONTO  [] NMR (49387) x     [] VASO  [x] Manual (87730) x   1   [] Other: ice  [] TA x      [] Mech Traction (83561)  [] ES(attended) (01653)      [] ES (un) (84008):     GOALS:  Patient stated goal: return to full function  []? Progressing: []? Met: []? Not Met: []? Adjusted     Therapist goals for Patient:   Short Term Goals: To be achieved in: 2 weeks  1. Independent in HEP and progression per patient tolerance, in order to prevent re-injury. []? Progressing: []? Met: []? Not Met: []? Adjusted  2.  Patient will have a decrease in pain to facilitate improvement in movement, function, and ADLs as indicated by Functional Deficits. []? Progressing: []? Met: []? Not Met: []? Adjusted     Long Term Goals: To be achieved in: 8 weeks  1. Disability index score of 30% or less for the [facility] to assist with reaching prior level of function. []? Progressing: []? Met: []? Not Met: []? Adjusted  2. Patient will demonstrate increased AROM to 150 flex, 90 ER, and 70 IR to allow for proper joint functioning as indicated by patients Functional Deficits. [x]? Progressing: []? Met: []? Not Met: []? Adjusted  3. Patient will demonstrate an increase in Strength to 4+/5 throughout the right UE to allow for proper functional mobility as indicated by patients Functional Deficits. []? Progressing: []? Met: []? Not Met: []? Adjusted  4. Patient will return to dressing, driving, combing hair with right hand without increased symptoms or restriction. [x]? Progressing: []? Met: []? Not Met: []? Adjusted  5. Participate in [facility] for return to sport. []? Progressing: []? Met: []? Not Met: []? Adjusted          Progression Towards Functional goals:  [x] Patient is progressing as expected towards functional goals listed. [] Progression is slowed due to complexities listed. [] Progression has been slowed due to co-morbidities. [] Plan just implemented, too soon to assess goals progression  [] Other:     ASSESSMENT:   Shrug sign is lessening with flex. Still present with abd. Overall Progression Towards Functional goals/ Treatment Progress Update:  [x] Patient is progressing as expected towards functional goals listed. [] Progression is slowed due to complexities/Impairments listed. [] Progression has been slowed due to co-morbidities.   [] Plan just implemented, too soon to assess goals progression <30days   [] Goals require adjustment due to lack of progress  [] Patient is not progressing as expected and requires additional follow up with physician  [] Other    Prognosis for POC: [x] Good [] Fair  [] Poor      Patient requires continued skilled intervention: [x] Yes  [] No    Treatment/Activity Tolerance:  [x] Patient able to complete treatment  [] Patient limited by fatigue  [] Patient limited by pain    [] Patient limited by other medical complications  [] Other:         PLAN: See eval  [x] Continue per plan of care [] Alter current plan (see comments above)  [] Plan of care initiated [] Hold pending MD visit [] Discharge      Electronically signed by:  Carly Palomares PT    Note: If patient does not return for scheduled/ recommended follow up visits, this note will serve as a discharge from care along with most recent update on progress.

## 2020-12-11 ENCOUNTER — HOSPITAL ENCOUNTER (OUTPATIENT)
Dept: PHYSICAL THERAPY | Age: 55
Setting detail: THERAPIES SERIES
Discharge: HOME | End: 2020-12-11
Payer: COMMERCIAL

## 2020-12-11 PROCEDURE — 97140 MANUAL THERAPY 1/> REGIONS: CPT | Performed by: PHYSICAL THERAPIST

## 2020-12-11 NOTE — FLOWSHEET NOTE
[facility],  [address] [facility] [place]  Phone: [phone]  Fax [phone]        Date:  2020    Patient Name:  Jacob Monday    :  1965  MRN: [id number]  Restrictions/Precautions:    Medical/Treatment Diagnosis Information:  · Diagnosis: S46.011D  traumatic RTC tear    s/p Double row supraspinatus repair, subscap repair, biceps tenodesis, labral debridement    DOS:  20  · Treatment Diagnosis: right shoulder pain M83.352  Insurance/Certification information:  PT Insurance Information: BCBS- $2500 deductible met  0 copay, 20 PT   (used 10)  Needs auth  Physician Information:  Referring Practitioner: Dr. Yolette Reich  Has the plan of care been signed (Y/N):        []  Yes  [x]  No     Date of Patient follow up with Physician: 20      Is this a Progress Report:     []  Yes  [x]  No        If Yes:  Date Range for reporting period:  Beginnin20  Ending:     Progress report will be due (10 Rx or 30 days whichever is less):        Recertification will be due (POC Duration  / 90 days whichever is less):        Visit # Insurance Allowable Auth Required   23 10 /  New ins  X 20 []  Yes []  No         Therapy Diagnosis/Practice Pattern:I      Number of Comorbidities:  [x]0     []1-2    []3+    Latex Allergy:  [x]NO      []YES  Preferred Language for Healthcare:   [x]English       []other:    SUBJECTIVE:  Pt has been sleeping on right side more, and that causes some pain. Pt is able to complete most ADLs. Pt feels most limited reaching into abd. OBJECTIVE: See eval   Observation: shoulder hike still present.   Firm end feel with PROM   Test measurements:     ROM Left Right   Shoulder Flex  140   Shoulder Abd     Shoulder ER  45   Shoulder IR     Elbow flex  WNL   Elbow ext  WNL   Strength  Left Right   Shoulder Flex  nt   Shoulder Scap  nt   Shoulder ER  nt   Shoulder IR  nt        Pain scale  0 /10 scapulothoracic and UE control with self care, carrying, lifting, driving/computer work. Home Exercise Program:    [x] (49536) Reviewed/Progressed HEP activities related to strengthening, flexibility, endurance, ROM of scapular, scapulothoracic and UE control with self care, reaching, carrying, lifting, house/yardwork, driving/computer work  [] (46605) Reviewed/Progressed HEP activities related to improving balance, coordination, kinesthetic sense, posture, motor skill, proprioception of scapular, scapulothoracic and UE control with self care, reaching, carrying, lifting, house/yardwork, driving/computer work      Manual Treatments:  PROM / STM / Oscillations-Mobs:  G-I, II, III, IV (PA's, Inf., Post.)  [x] (28792) Provided manual therapy to mobilize soft tissue/joints of cervical/CT, scapular GHJ and UE for the purpose of modulating pain, promoting relaxation,  increasing ROM, reducing/eliminating soft tissue swelling/inflammation/restriction, improving soft tissue extensibility and allowing for proper ROM for normal function with self care, reaching, carrying, lifting, house/yardwork, driving/computer work    Modalities:      Charges:  Timed Code Treatment Minutes: 20   Total Treatment Minutes: 20       [] EVAL (LOW) 93229 (typically 20 minutes face-to-face)  [] EVAL (MOD) 30241 (typically 30 minutes face-to-face)  [] EVAL (HIGH) 09925 (typically 45 minutes face-to-face)  [] RE-EVAL     [] HJ(76915) x     [] IONTO  [] NMR (67391) x     [] VASO  [x] Manual (91048) x   1   [] Other: ice  [] TA x      [] Mech Traction (85255)  [] ES(attended) (32309)      [] ES (un) (72533):     GOALS:  Patient stated goal: return to full function  []? Progressing: []? Met: []? Not Met: []? Adjusted     Therapist goals for Patient:   Short Term Goals: To be achieved in: 2 weeks  1. Independent in HEP and progression per patient tolerance, in order to prevent re-injury. []? Progressing: []? Met: []? Not Met: []? Adjusted  2. Patient will have a decrease in pain to facilitate improvement in movement, function, and ADLs as indicated by Functional Deficits. []? Progressing: []? Met: []? Not Met: []? Adjusted     Long Term Goals: To be achieved in: 8 weeks  1. Disability index score of 30% or less for the [facility] to assist with reaching prior level of function. []? Progressing: []? Met: []? Not Met: []? Adjusted  2. Patient will demonstrate increased AROM to 150 flex, 90 ER, and 70 IR to allow for proper joint functioning as indicated by patients Functional Deficits. [x]? Progressing: []? Met: []? Not Met: []? Adjusted  3. Patient will demonstrate an increase in Strength to 4+/5 throughout the right UE to allow for proper functional mobility as indicated by patients Functional Deficits. []? Progressing: []? Met: []? Not Met: []? Adjusted  4. Patient will return to dressing, driving, combing hair with right hand without increased symptoms or restriction. [x]? Progressing: []? Met: []? Not Met: []? Adjusted  5. Participate in [facility] for return to sport. []? Progressing: []? Met: []? Not Met: []? Adjusted          Progression Towards Functional goals:  [x] Patient is progressing as expected towards functional goals listed. [] Progression is slowed due to complexities listed. [] Progression has been slowed due to co-morbidities. [] Plan just implemented, too soon to assess goals progression  [] Other:     ASSESSMENT:   Shrug sign is lessening with flex. Still present with abd. Overall Progression Towards Functional goals/ Treatment Progress Update:  [x] Patient is progressing as expected towards functional goals listed. [] Progression is slowed due to complexities/Impairments listed. [] Progression has been slowed due to co-morbidities.   [] Plan just implemented, too soon to assess goals progression <30days   [] Goals require adjustment due to lack of progress  [] Patient is not progressing as expected and requires

## 2020-12-16 ENCOUNTER — HOSPITAL ENCOUNTER (OUTPATIENT)
Dept: PHYSICAL THERAPY | Age: 55
Setting detail: THERAPIES SERIES
Discharge: HOME | End: 2020-12-16
Payer: COMMERCIAL

## 2020-12-16 PROCEDURE — 97140 MANUAL THERAPY 1/> REGIONS: CPT | Performed by: PHYSICAL THERAPIST

## 2020-12-16 NOTE — FLOWSHEET NOTE
[facility],  [address] [place]  Phone: [phone]  Fax [phone]        Date:  2020    Patient Name:  Gautam Palencia    :  1965  MRN: [id number]  Restrictions/Precautions:    Medical/Treatment Diagnosis Information:  · Diagnosis: S46.011D  traumatic RTC tear    s/p Double row supraspinatus repair, subscap repair, biceps tenodesis, labral debridement    DOS:  20  · Treatment Diagnosis: right shoulder pain N03.785  Insurance/Certification information:  PT Insurance Information: BCBS- $2500 deductible met  0 copay, 20 PT   (used 10)  Needs auth  Physician Information:  Referring Practitioner: Dr. Darian Tena  Has the plan of care been signed (Y/N):        []  Yes  [x]  No     Date of Patient follow up with Physician: 20      Is this a Progress Report:     []  Yes  [x]  No        If Yes:  Date Range for reporting period:  Beginnin20  Ending:     Progress report will be due (10 Rx or 30 days whichever is less):        Recertification will be due (POC Duration  / 90 days whichever is less):        Visit # Insurance Allowable Auth Required   24 10 /  New ins  X 20 []  Yes []  No         Therapy Diagnosis/Practice Pattern:I      Number of Comorbidities:  [x]0     []1-2    []3+    Latex Allergy:  [x]NO      []YES  Preferred Language for Healthcare:   [x]English       []other:    SUBJECTIVE:  Pt feels stiff when sleeping on right side. Shoulder does not inhibit sleep. No shoulder pain during the day. OBJECTIVE: See eval   Observation: shoulder hike still present.   Firm end feel with PROM   Test measurements:     ROM Left Right   Shoulder Flex  140   Shoulder Abd     Shoulder ER  45   Shoulder IR     Elbow flex  WNL   Elbow ext  WNL   Strength  Left Right   Shoulder Flex  nt   Shoulder Scap  nt   Shoulder ER  nt   Shoulder IR  nt        Pain scale  0 /10   Quick dash   43 = 73% RESTRICTIONS/PRECAUTIONS: history of bilat ACLs, achilles tendon repair. [place]:  9/8/20  Over the next a month were going to focus on some gentle progression with his glenohumeral stretching. Beginning active assisted to independent engagement of his rotator cuff. No significant resistance or quick dynamic movements. Exercises/Interventions:      Therapeutic Ex (70707) Sets/sec Reps Notes/CUES         Sleeper s    Supine pec s    Supine post cap s    Serratus punch hep   Supine R/S  2 directions    Supine horizontal abd    Supine PNF D2    SL ERC             Modified IR s :10 X 10    pulleys  X 20     Wall walks    Table  push ups    TB punch GR X 30     TB  IR/ ER GR X 20        True S  flex  X 10     Eccentric lowering flex/  scap 3# x 5 3# x 5    CC row/    CC triceps 50# x 30 40# x 30     CC LPD  60# x 30            Manual Intervention (57015)      Joint mobs  Inf and post  4 min    sidelying scap mobs  2 min    PROM of shoulder. 5 min                   NMR re-education (44064)   CUES NEEDED                                   Therapeutic Exercise and NMR EXR  [x] (99304) Provided verbal/tactile cueing for activities related to strengthening, flexibility, endurance, ROM  for improvements in scapular, scapulothoracic and UE control with self care, reaching, carrying, lifting, house/yardwork, driving/computer work.    [] (98481) Provided verbal/tactile cueing for activities related to improving balance, coordination, kinesthetic sense, posture, motor skill, proprioception  to assist with  scapular, scapulothoracic and UE control with self care, reaching, carrying, lifting, house/yardwork, driving/computer work.     Therapeutic Activities:    [] (81595 or 80964) Provided verbal/tactile cueing for activities related to improving balance, coordination, kinesthetic sense, posture, motor skill, proprioception and motor activation to allow for proper function of scapular, scapulothoracic and UE control with self care, carrying, lifting, driving/computer work. Home Exercise Program:    [x] (13106) Reviewed/Progressed HEP activities related to strengthening, flexibility, endurance, ROM of scapular, scapulothoracic and UE control with self care, reaching, carrying, lifting, house/yardwork, driving/computer work  [] (07642) Reviewed/Progressed HEP activities related to improving balance, coordination, kinesthetic sense, posture, motor skill, proprioception of scapular, scapulothoracic and UE control with self care, reaching, carrying, lifting, house/yardwork, driving/computer work      Manual Treatments:  PROM / STM / Oscillations-Mobs:  G-I, II, III, IV (PA's, Inf., Post.)  [x] (19936) Provided manual therapy to mobilize soft tissue/joints of cervical/CT, scapular GHJ and UE for the purpose of modulating pain, promoting relaxation,  increasing ROM, reducing/eliminating soft tissue swelling/inflammation/restriction, improving soft tissue extensibility and allowing for proper ROM for normal function with self care, reaching, carrying, lifting, house/yardwork, driving/computer work    Modalities:      Charges:  Timed Code Treatment Minutes: 20   Total Treatment Minutes: 20       [] EVAL (LOW) 94621 (typically 20 minutes face-to-face)  [] EVAL (MOD) 41610 (typically 30 minutes face-to-face)  [] EVAL (HIGH) 40634 (typically 45 minutes face-to-face)  [] RE-EVAL     [] UC(54418) x     [] IONTO  [] NMR (74339) x     [] VASO  [x] Manual (08902) x   1   [] Other: ice  [] TA x      [] Mech Traction (80841)  [] ES(attended) (31677)      [] ES (un) (69182):     GOALS:  Patient stated goal: return to full function  []? Progressing: []? Met: []? Not Met: []? Adjusted     Therapist goals for Patient:   Short Term Goals: To be achieved in: 2 weeks  1. Independent in HEP and progression per patient tolerance, in order to prevent re-injury. []? Progressing: []? Met: []? Not Met: []? Adjusted  2.  Patient will have a decrease in pain to facilitate improvement in movement, function, and ADLs as indicated by Functional Deficits. []? Progressing: []? Met: []? Not Met: []? Adjusted     Long Term Goals: To be achieved in: 8 weeks  1. Disability index score of 30% or less for the [facility] to assist with reaching prior level of function. []? Progressing: []? Met: []? Not Met: []? Adjusted  2. Patient will demonstrate increased AROM to 150 flex, 90 ER, and 70 IR to allow for proper joint functioning as indicated by patients Functional Deficits. [x]? Progressing: []? Met: []? Not Met: []? Adjusted  3. Patient will demonstrate an increase in Strength to 4+/5 throughout the right UE to allow for proper functional mobility as indicated by patients Functional Deficits. []? Progressing: []? Met: []? Not Met: []? Adjusted  4. Patient will return to dressing, driving, combing hair with right hand without increased symptoms or restriction. [x]? Progressing: []? Met: []? Not Met: []? Adjusted  5. Participate in [facility] for return to sport. []? Progressing: []? Met: []? Not Met: []? Adjusted          Progression Towards Functional goals:  [x] Patient is progressing as expected towards functional goals listed. [] Progression is slowed due to complexities listed. [] Progression has been slowed due to co-morbidities. [] Plan just implemented, too soon to assess goals progression  [] Other:     ASSESSMENT:  Pt does have clicking and tenderness at [facility] [place], operated by [facility] joint. Worked on thoracic mobility. Overall Progression Towards Functional goals/ Treatment Progress Update:  [x] Patient is progressing as expected towards functional goals listed. [] Progression is slowed due to complexities/Impairments listed. [] Progression has been slowed due to co-morbidities.   [] Plan just implemented, too soon to assess goals progression <30days   [] Goals require adjustment due to lack of progress  [] Patient is not progressing as expected and requires additional follow up with physician  [] Other    Prognosis for POC: [x] Good [] Fair  [] Poor      Patient requires continued skilled intervention: [x] Yes  [] No    Treatment/Activity Tolerance:  [x] Patient able to complete treatment  [] Patient limited by fatigue  [] Patient limited by pain    [] Patient limited by other medical complications  [] Other:         PLAN: See eval  [x] Continue per plan of care [] Alter current plan (see comments above)  [] Plan of care initiated [] Hold pending MD visit [] Discharge      Electronically signed by:  Nathan Cowan PT    Note: If patient does not return for scheduled/ recommended follow up visits, this note will serve as a discharge from care along with most recent update on progress.

## 2020-12-18 ENCOUNTER — HOSPITAL ENCOUNTER (OUTPATIENT)
Dept: PHYSICAL THERAPY | Age: 55
Setting detail: THERAPIES SERIES
Discharge: HOME | End: 2020-12-18
Payer: COMMERCIAL

## 2020-12-18 PROCEDURE — 97140 MANUAL THERAPY 1/> REGIONS: CPT | Performed by: PHYSICAL THERAPIST

## 2020-12-18 NOTE — FLOWSHEET NOTE
[facility],  [address] [facility] [place]  Phone: [phone]  Fax [phone]        Date:  2020    Patient Name:  Louretta Bernheim    :  1965  MRN: [id number]  Restrictions/Precautions:    Medical/Treatment Diagnosis Information:  · Diagnosis: S46.011D  traumatic RTC tear    s/p Double row supraspinatus repair, subscap repair, biceps tenodesis, labral debridement    DOS:  20  · Treatment Diagnosis: right shoulder pain S35.817  Insurance/Certification information:  PT Insurance Information: BCBS- $2500 deductible met  0 copay, 20 PT   (used 10)  Needs auth  Physician Information:  Referring Practitioner: Dr. Fatou Shabazz  Has the plan of care been signed (Y/N):        []  Yes  [x]  No     Date of Patient follow up with Physician: 20      Is this a Progress Report:     []  Yes  [x]  No        If Yes:  Date Range for reporting period:  Beginnin20  Ending:     Progress report will be due (10 Rx or 30 days whichever is less):        Recertification will be due (POC Duration  / 90 days whichever is less):        Visit # Insurance Allowable Auth Required   25 10 /  New ins  X 20 []  Yes []  No         Therapy Diagnosis/Practice Pattern:I      Number of Comorbidities:  [x]0     []1-2    []3+    Latex Allergy:  [x]NO      []YES  Preferred Language for Healthcare:   [x]English       []other:    SUBJECTIVE:  Pt was a little sore after last visit, but it has resolved. Pt arrived 25 min after appment time. OBJECTIVE: See eval   Observation: shoulder hike still present.   Firm end feel with PROM   Test measurements:     ROM Left Right   Shoulder Flex  140   Shoulder Abd     Shoulder ER  45   Shoulder IR     Elbow flex  WNL   Elbow ext  WNL   Strength  Left Right   Shoulder Flex  nt   Shoulder Scap  nt   Shoulder ER  nt   Shoulder IR  nt        Pain scale  0 /10   Quick dash   43 = 73% RESTRICTIONS/PRECAUTIONS: history of bilat ACLs, achilles tendon repair. [place]:  9/8/20  Over the next a month were going to focus on some gentle progression with his glenohumeral stretching. Beginning active assisted to independent engagement of his rotator cuff. No significant resistance or quick dynamic movements. Exercises/Interventions:      Therapeutic Ex (59749) Sets/sec Reps Notes/CUES         Sleeper s    Supine pec s    Supine post cap s    Serratus punch hep   Supine R/S  2 directions    Supine horizontal abd    Supine PNF D2    SL ERC             Modified IR s :10 X 10    pulleys  X 20     Wall walks    Table  push ups    TB punch GR X 30     TB  IR/ ER GR X 20        True S  flex  X 10     Eccentric lowering flex/  scap 3# x 5 3# x 5    CC row/    CC triceps 50# x 30 40# x 30     CC LPD  60# x 30            Manual Intervention (47551)      Joint mobs  Inf and post  4 min    sidelying scap mobs  2 min    PROM of shoulder. 5 min                   NMR re-education (51310)   CUES NEEDED                                   Therapeutic Exercise and NMR EXR  [x] (21188) Provided verbal/tactile cueing for activities related to strengthening, flexibility, endurance, ROM  for improvements in scapular, scapulothoracic and UE control with self care, reaching, carrying, lifting, house/yardwork, driving/computer work.    [] (18906) Provided verbal/tactile cueing for activities related to improving balance, coordination, kinesthetic sense, posture, motor skill, proprioception  to assist with  scapular, scapulothoracic and UE control with self care, reaching, carrying, lifting, house/yardwork, driving/computer work.     Therapeutic Activities:    [] (21540 or 24093) Provided verbal/tactile cueing for activities related to improving balance, coordination, kinesthetic sense, posture, motor skill, proprioception and motor activation to allow for proper function of scapular, scapulothoracic and UE control with self care, carrying, lifting, driving/computer work. Home Exercise Program:    [x] (82351) Reviewed/Progressed HEP activities related to strengthening, flexibility, endurance, ROM of scapular, scapulothoracic and UE control with self care, reaching, carrying, lifting, house/yardwork, driving/computer work  [] (82642) Reviewed/Progressed HEP activities related to improving balance, coordination, kinesthetic sense, posture, motor skill, proprioception of scapular, scapulothoracic and UE control with self care, reaching, carrying, lifting, house/yardwork, driving/computer work      Manual Treatments:  PROM / STM / Oscillations-Mobs:  G-I, II, III, IV (PA's, Inf., Post.)  [x] (15464) Provided manual therapy to mobilize soft tissue/joints of cervical/CT, scapular GHJ and UE for the purpose of modulating pain, promoting relaxation,  increasing ROM, reducing/eliminating soft tissue swelling/inflammation/restriction, improving soft tissue extensibility and allowing for proper ROM for normal function with self care, reaching, carrying, lifting, house/yardwork, driving/computer work    Modalities:      Charges:  Timed Code Treatment Minutes: 20   Total Treatment Minutes: 20       [] EVAL (LOW) 95254 (typically 20 minutes face-to-face)  [] EVAL (MOD) 27147 (typically 30 minutes face-to-face)  [] EVAL (HIGH) 97012 (typically 45 minutes face-to-face)  [] RE-EVAL     [] SX(70436) x     [] IONTO  [] NMR (26031) x     [] VASO  [x] Manual (34590) x   1   [] Other: ice  [] TA x      [] Mech Traction (36225)  [] ES(attended) (01801)      [] ES (un) (85415):     GOALS:  Patient stated goal: return to full function  []? Progressing: []? Met: []? Not Met: []? Adjusted     Therapist goals for Patient:   Short Term Goals: To be achieved in: 2 weeks  1. Independent in HEP and progression per patient tolerance, in order to prevent re-injury. []? Progressing: []? Met: []? Not Met: []? Adjusted  2.  Patient will have a decrease in pain to facilitate improvement in movement, function, and ADLs as indicated by Functional Deficits. []? Progressing: []? Met: []? Not Met: []? Adjusted     Long Term Goals: To be achieved in: 8 weeks  1. Disability index score of 30% or less for the [facility] to assist with reaching prior level of function. []? Progressing: []? Met: []? Not Met: []? Adjusted  2. Patient will demonstrate increased AROM to 150 flex, 90 ER, and 70 IR to allow for proper joint functioning as indicated by patients Functional Deficits. [x]? Progressing: []? Met: []? Not Met: []? Adjusted  3. Patient will demonstrate an increase in Strength to 4+/5 throughout the right UE to allow for proper functional mobility as indicated by patients Functional Deficits. []? Progressing: []? Met: []? Not Met: []? Adjusted  4. Patient will return to dressing, driving, combing hair with right hand without increased symptoms or restriction. [x]? Progressing: []? Met: []? Not Met: []? Adjusted  5. Participate in [facility] for return to sport. []? Progressing: []? Met: []? Not Met: []? Adjusted          Progression Towards Functional goals:  [x] Patient is progressing as expected towards functional goals listed. [] Progression is slowed due to complexities listed. [] Progression has been slowed due to co-morbidities. [] Plan just implemented, too soon to assess goals progression  [] Other:     ASSESSMENT:  Pt does have clicking and tenderness at [facility] [place] joint. Worked on thoracic mobility. Overall Progression Towards Functional goals/ Treatment Progress Update:  [x] Patient is progressing as expected towards functional goals listed. [] Progression is slowed due to complexities/Impairments listed. [] Progression has been slowed due to co-morbidities.   [] Plan just implemented, too soon to assess goals progression <30days   [] Goals require adjustment due to lack of progress  [] Patient is not progressing as expected and requires additional follow up with physician  [] Other    Prognosis for POC: [x] Good [] Fair  [] Poor      Patient requires continued skilled intervention: [x] Yes  [] No    Treatment/Activity Tolerance:  [x] Patient able to complete treatment  [] Patient limited by fatigue  [] Patient limited by pain    [] Patient limited by other medical complications  [] Other:         PLAN: See eval  [x] Continue per plan of care [] Alter current plan (see comments above)  [] Plan of care initiated [] Hold pending MD visit [] Discharge      Electronically signed by:  Katerina Harvey PT    Note: If patient does not return for scheduled/ recommended follow up visits, this note will serve as a discharge from care along with most recent update on progress.

## 2020-12-22 ENCOUNTER — HOSPITAL ENCOUNTER (OUTPATIENT)
Dept: PHYSICAL THERAPY | Age: 55
Setting detail: THERAPIES SERIES
Discharge: HOME | End: 2020-12-22
Payer: COMMERCIAL

## 2020-12-22 PROCEDURE — 97140 MANUAL THERAPY 1/> REGIONS: CPT | Performed by: PHYSICAL THERAPIST

## 2020-12-22 NOTE — FLOWSHEET NOTE
[facility],  [address] [place]  Phone: [phone]  Fax [phone]        Date:  2020    Patient Name:  Jacob Monday    :  1965  MRN: [id number]  Restrictions/Precautions:    Medical/Treatment Diagnosis Information:  · Diagnosis: S46.011D  traumatic RTC tear    s/p Double row supraspinatus repair, subscap repair, biceps tenodesis, labral debridement    DOS:  20  · Treatment Diagnosis: right shoulder pain E05.242  Insurance/Certification information:  PT Insurance Information: BCBS- $2500 deductible met  0 copay, 20 PT   (used 10)  Needs auth  Physician Information:  Referring Practitioner: Dr. Yolette Reich  Has the plan of care been signed (Y/N):        []  Yes  [x]  No     Date of Patient follow up with Physician: 20      Is this a Progress Report:     []  Yes  [x]  No        If Yes:  Date Range for reporting period:  Beginnin20  Ending:     Progress report will be due (10 Rx or 30 days whichever is less):        Recertification will be due (POC Duration  / 90 days whichever is less):        Visit # Insurance Allowable Auth Required   26 10 /  New ins  X 20 []  Yes []  No         Therapy Diagnosis/Practice Pattern:I      Number of Comorbidities:  [x]0     []1-2    []3+    Latex Allergy:  [x]NO      []YES  Preferred Language for Healthcare:   [x]English       []other:    SUBJECTIVE:  Pt states that shoulder feels good today. Has really worked motion at home. OBJECTIVE: See eval   Observation: shoulder hike still present.   Firm end feel with PROM   Test measurements:     ROM Left Right   Shoulder Flex  140   Shoulder Abd     Shoulder ER  45   Shoulder IR     Elbow flex  WNL   Elbow ext  WNL   Strength  Left Right   Shoulder Flex  nt   Shoulder Scap  nt   Shoulder ER  nt   Shoulder IR  nt        Pain scale  0 /10   Quick dash   43 = 73%     RESTRICTIONS/PRECAUTIONS: history of bilat work.     Home Exercise Program:    [x] (95328) Reviewed/Progressed HEP activities related to strengthening, flexibility, endurance, ROM of scapular, scapulothoracic and UE control with self care, reaching, carrying, lifting, house/yardwork, driving/computer work  [] (82890) Reviewed/Progressed HEP activities related to improving balance, coordination, kinesthetic sense, posture, motor skill, proprioception of scapular, scapulothoracic and UE control with self care, reaching, carrying, lifting, house/yardwork, driving/computer work      Manual Treatments:  PROM / STM / Oscillations-Mobs:  G-I, II, III, IV (PA's, Inf., Post.)  [x] (02745) Provided manual therapy to mobilize soft tissue/joints of cervical/CT, scapular GHJ and UE for the purpose of modulating pain, promoting relaxation,  increasing ROM, reducing/eliminating soft tissue swelling/inflammation/restriction, improving soft tissue extensibility and allowing for proper ROM for normal function with self care, reaching, carrying, lifting, house/yardwork, driving/computer work    Modalities:      Charges:  Timed Code Treatment Minutes: 20   Total Treatment Minutes: 20       [] EVAL (LOW) 65834 (typically 20 minutes face-to-face)  [] EVAL (MOD) 92915 (typically 30 minutes face-to-face)  [] EVAL (HIGH) 20337 (typically 45 minutes face-to-face)  [] RE-EVAL     [] GK(01002) x     [] IONTO  [] NMR (90541) x     [] VASO  [x] Manual (46308) x   1   [] Other: ice  [] TA x      [] Mech Traction (83734)  [] ES(attended) (09238)      [] ES (un) (90282):     GOALS:  Patient stated goal: return to full function  []? Progressing: []? Met: []? Not Met: []? Adjusted     Therapist goals for Patient:   Short Term Goals: To be achieved in: 2 weeks  1. Independent in HEP and progression per patient tolerance, in order to prevent re-injury. []? Progressing: []? Met: []? Not Met: []? Adjusted  2.  Patient will have a decrease in pain to facilitate improvement in movement, function, and ADLs as indicated by Functional Deficits. []? Progressing: []? Met: []? Not Met: []? Adjusted     Long Term Goals: To be achieved in: 8 weeks  1. Disability index score of 30% or less for the [facility] to assist with reaching prior level of function. []? Progressing: []? Met: []? Not Met: []? Adjusted  2. Patient will demonstrate increased AROM to 150 flex, 90 ER, and 70 IR to allow for proper joint functioning as indicated by patients Functional Deficits. [x]? Progressing: []? Met: []? Not Met: []? Adjusted  3. Patient will demonstrate an increase in Strength to 4+/5 throughout the right UE to allow for proper functional mobility as indicated by patients Functional Deficits. []? Progressing: []? Met: []? Not Met: []? Adjusted  4. Patient will return to dressing, driving, combing hair with right hand without increased symptoms or restriction. [x]? Progressing: []? Met: []? Not Met: []? Adjusted  5. Participate in [facility] for return to sport. []? Progressing: []? Met: []? Not Met: []? Adjusted          Progression Towards Functional goals:  [x] Patient is progressing as expected towards functional goals listed. [] Progression is slowed due to complexities listed. [] Progression has been slowed due to co-morbidities. [] Plan just implemented, too soon to assess goals progression  [] Other:     ASSESSMENT:  Pt does have clicking and tenderness at [facility] joint. Worked on thoracic mobility. Overall Progression Towards Functional goals/ Treatment Progress Update:  [x] Patient is progressing as expected towards functional goals listed. [] Progression is slowed due to complexities/Impairments listed. [] Progression has been slowed due to co-morbidities.   [] Plan just implemented, too soon to assess goals progression <30days   [] Goals require adjustment due to lack of progress  [] Patient is not progressing as expected and requires additional follow up with physician  [] Other    Prognosis for POC: [x] Good [] Fair  [] Poor      Patient requires continued skilled intervention: [x] Yes  [] No    Treatment/Activity Tolerance:  [x] Patient able to complete treatment  [] Patient limited by fatigue  [] Patient limited by pain    [] Patient limited by other medical complications  [] Other:         PLAN: See eval  [x] Continue per plan of care [] Alter current plan (see comments above)  [] Plan of care initiated [] Hold pending MD visit [] Discharge      Electronically signed by:  Andres Akbar PT    Note: If patient does not return for scheduled/ recommended follow up visits, this note will serve as a discharge from care along with most recent update on progress.

## 2020-12-24 ENCOUNTER — HOSPITAL ENCOUNTER (OUTPATIENT)
Dept: PHYSICAL THERAPY | Age: 55
Setting detail: THERAPIES SERIES
Discharge: HOME | End: 2020-12-24
Payer: COMMERCIAL

## 2020-12-24 PROCEDURE — 97140 MANUAL THERAPY 1/> REGIONS: CPT | Performed by: PHYSICAL THERAPIST

## 2020-12-24 NOTE — FLOWSHEET NOTE
[facility],  [address]  Phone: [phone]  Fax [phone]        Date:  2020    Patient Name:  Shamir Blackwell    :  1965  MRN: [id number]  Restrictions/Precautions:    Medical/Treatment Diagnosis Information:  · Diagnosis: S46.011D  traumatic RTC tear    s/p Double row supraspinatus repair, subscap repair, biceps tenodesis, labral debridement    DOS:  20  · Treatment Diagnosis: right shoulder pain L73.070  Insurance/Certification information:  PT Insurance Information: BCBS- $2500 deductible met  0 copay, 20 PT   (used 10)  Needs auth  Physician Information:  Referring Practitioner: Dr. Hernández Roll  Has the plan of care been signed (Y/N):        []  Yes  [x]  No     Date of Patient follow up with Physician: 20      Is this a Progress Report:     []  Yes  [x]  No        If Yes:  Date Range for reporting period:  Beginnin20  Ending:     Progress report will be due (10 Rx or 30 days whichever is less):        Recertification will be due (POC Duration  / 90 days whichever is less):        Visit # Insurance Allowable Auth Required   27 10 /  New ins  X 20 []  Yes []  No         Therapy Diagnosis/Practice Pattern:I      Number of Comorbidities:  [x]0     []1-2    []3+    Latex Allergy:  [x]NO      []YES  Preferred Language for Healthcare:   [x]English       []other:    SUBJECTIVE:  Pt cont to have c/o stiffness in shoulder. Shoulder does not wake him from sleep. Pt was able to cook without shoulder pain     OBJECTIVE: See eval   Observation: shoulder hike still present.   Firm end feel with PROM   Test measurements:     ROM Left Right   Shoulder Flex  140   Shoulder Abd     Shoulder ER  45   Shoulder IR     Elbow flex  WNL   Elbow ext  WNL   Strength  Left Right   Shoulder Flex  nt   Shoulder Scap  nt   Shoulder ER  nt   Shoulder IR  nt        Pain scale  0 /10   Quick dash   43 = 73% facilitate improvement in movement, function, and ADLs as indicated by Functional Deficits. []? Progressing: []? Met: []? Not Met: []? Adjusted     Long Term Goals: To be achieved in: 8 weeks  1. Disability index score of 30% or less for the [facility] to assist with reaching prior level of function. []? Progressing: []? Met: []? Not Met: []? Adjusted  2. Patient will demonstrate increased AROM to 150 flex, 90 ER, and 70 IR to allow for proper joint functioning as indicated by patients Functional Deficits. [x]? Progressing: []? Met: []? Not Met: []? Adjusted  3. Patient will demonstrate an increase in Strength to 4+/5 throughout the right UE to allow for proper functional mobility as indicated by patients Functional Deficits. []? Progressing: []? Met: []? Not Met: []? Adjusted  4. Patient will return to dressing, driving, combing hair with right hand without increased symptoms or restriction. [x]? Progressing: []? Met: []? Not Met: []? Adjusted  5. Participate in [facility] for return to sport. []? Progressing: []? Met: []? Not Met: []? Adjusted          Progression Towards Functional goals:  [x] Patient is progressing as expected towards functional goals listed. [] Progression is slowed due to complexities listed. [] Progression has been slowed due to co-morbidities. [] Plan just implemented, too soon to assess goals progression  [] Other:     ASSESSMENT:  Pt does have clicking and tenderness at [facility] [place] by [facility] joint. Worked on thoracic mobility. Overall Progression Towards Functional goals/ Treatment Progress Update:  [x] Patient is progressing as expected towards functional goals listed. [] Progression is slowed due to complexities/Impairments listed. [] Progression has been slowed due to co-morbidities.   [] Plan just implemented, too soon to assess goals progression <30days   [] Goals require adjustment due to lack of progress  [] Patient is not progressing as expected and requires additional follow up with physician  [] Other    Prognosis for POC: [x] Good [] Fair  [] Poor      Patient requires continued skilled intervention: [x] Yes  [] No    Treatment/Activity Tolerance:  [x] Patient able to complete treatment  [] Patient limited by fatigue  [] Patient limited by pain    [] Patient limited by other medical complications  [] Other:         PLAN: See eval  [x] Continue per plan of care [] Alter current plan (see comments above)  [] Plan of care initiated [] Hold pending MD visit [] Discharge      Electronically signed by:  Elen Weathers PT    Note: If patient does not return for scheduled/ recommended follow up visits, this note will serve as a discharge from care along with most recent update on progress.

## 2020-12-29 ENCOUNTER — HOSPITAL ENCOUNTER (OUTPATIENT)
Dept: PHYSICAL THERAPY | Age: 55
Setting detail: THERAPIES SERIES
Discharge: HOME | End: 2020-12-29
Payer: COMMERCIAL

## 2020-12-29 PROCEDURE — 97140 MANUAL THERAPY 1/> REGIONS: CPT | Performed by: PHYSICAL THERAPIST

## 2020-12-29 NOTE — FLOWSHEET NOTE
[facility], [address] [place]  Phone: [phone]  Fax [phone]        Date:  2020    Patient Name:  Jhoana Virk    :  1965  MRN: [id number]  Restrictions/Precautions:    Medical/Treatment Diagnosis Information:  · Diagnosis: S46.011D  traumatic RTC tear    s/p Double row supraspinatus repair, subscap repair, biceps tenodesis, labral debridement    DOS:  20  · Treatment Diagnosis: right shoulder pain E39.549  Insurance/Certification information:  PT Insurance Information: BCBS- $2500 deductible met  0 copay, 20 PT   (used 10)  Needs auth  Physician Information:  Referring Practitioner: Dr. Gary Polanco  Has the plan of care been signed (Y/N):        []  Yes  [x]  No     Date of Patient follow up with Physician: 20      Is this a Progress Report:     []  Yes  [x]  No        If Yes:  Date Range for reporting period:  Beginnin20  Ending:     Progress report will be due (10 Rx or 30 days whichever is less):        Recertification will be due (POC Duration  / 90 days whichever is less):        Visit # Insurance Allowable Auth Required   28 10 /  New ins  X 20 []  Yes []  No         Therapy Diagnosis/Practice Pattern:I      Number of Comorbidities:  [x]0     []1-2    []3+    Latex Allergy:  [x]NO      []YES  Preferred Language for Healthcare:   [x]English       []other:    SUBJECTIVE:  Pt is frustrated with stiffness. OBJECTIVE: See eval   Observation: shoulder hike still present. Firm end feel with PROM   Test measurements:     ROM Left Right   Shoulder Flex  140   Shoulder Abd     Shoulder ER  45   Shoulder IR     Elbow flex  WNL   Elbow ext  WNL   Strength  Left Right   Shoulder Flex  nt   Shoulder Scap  nt   Shoulder ER  nt   Shoulder IR  nt        Pain scale  0 /10   Quick dash   43 = 73%     RESTRICTIONS/PRECAUTIONS: history of bilat ACLs, achilles tendon repair.     Priscilla: 9/8/20  Over the next a month were going to focus on some gentle progression with his glenohumeral stretching. Beginning active assisted to independent engagement of his rotator cuff. No significant resistance or quick dynamic movements. Exercises/Interventions:      Therapeutic Ex (89252) Sets/sec Reps Notes/CUES         Sleeper s    Supine pec s    Supine post cap s    Serratus punch hep   Supine R/S  2 directions    Supine horizontal abd    Supine PNF D2    SL ERC             Modified IR s :10 X 10    pulleys  X 20     Wall walks    Table  push ups    TB punch GR X 30     TB  IR/ ER GR X 20        True S  flex  X 10     Eccentric lowering flex/  scap 3# x 5 3# x 5    CC row/    CC triceps 50# x 30 40# x 30     CC LPD  60# x 30            Manual Intervention (24916)      Joint mobs  Inf and post  4 min    sidelying scap mobs  2 min    PROM of shoulder. 5 min                   NMR re-education (89714)   CUES NEEDED                                   Therapeutic Exercise and NMR EXR  [x] (90701) Provided verbal/tactile cueing for activities related to strengthening, flexibility, endurance, ROM  for improvements in scapular, scapulothoracic and UE control with self care, reaching, carrying, lifting, house/yardwork, driving/computer work.    [] (11247) Provided verbal/tactile cueing for activities related to improving balance, coordination, kinesthetic sense, posture, motor skill, proprioception  to assist with  scapular, scapulothoracic and UE control with self care, reaching, carrying, lifting, house/yardwork, driving/computer work. Therapeutic Activities:    [] (54803 or 21908) Provided verbal/tactile cueing for activities related to improving balance, coordination, kinesthetic sense, posture, motor skill, proprioception and motor activation to allow for proper function of scapular, scapulothoracic and UE control with self care, carrying, lifting, driving/computer work.      Home Exercise Program:    [x] (93293) Reviewed/Progressed HEP activities related to strengthening, flexibility, endurance, ROM of scapular, scapulothoracic and UE control with self care, reaching, carrying, lifting, house/yardwork, driving/computer work  [] (92587) Reviewed/Progressed HEP activities related to improving balance, coordination, kinesthetic sense, posture, motor skill, proprioception of scapular, scapulothoracic and UE control with self care, reaching, carrying, lifting, house/yardwork, driving/computer work      Manual Treatments:  PROM / STM / Oscillations-Mobs:  G-I, II, III, IV (PA's, Inf., Post.)  [x] (20716) Provided manual therapy to mobilize soft tissue/joints of cervical/CT, scapular GHJ and UE for the purpose of modulating pain, promoting relaxation,  increasing ROM, reducing/eliminating soft tissue swelling/inflammation/restriction, improving soft tissue extensibility and allowing for proper ROM for normal function with self care, reaching, carrying, lifting, house/yardwork, driving/computer work    Modalities:      Charges:  Timed Code Treatment Minutes: 20   Total Treatment Minutes: 20       [] EVAL (LOW) 31186 (typically 20 minutes face-to-face)  [] EVAL (MOD) 68304 (typically 30 minutes face-to-face)  [] EVAL (HIGH) 65407 (typically 45 minutes face-to-face)  [] RE-EVAL     [] II(34629) x     [] IONTO  [] NMR (66729) x     [] VASO  [x] Manual (82853) x   1   [] Other: ice  [] TA x      [] Mech Traction (32927)  [] ES(attended) (26864)      [] ES (un) (92764):     GOALS:  Patient stated goal: return to full function  []? Progressing: []? Met: []? Not Met: []? Adjusted     Therapist goals for Patient:   Short Term Goals: To be achieved in: 2 weeks  1. Independent in HEP and progression per patient tolerance, in order to prevent re-injury. []? Progressing: []? Met: []? Not Met: []? Adjusted  2.  Patient will have a decrease in pain to facilitate improvement in movement, function, and ADLs as indicated by Functional Deficits. []? Progressing: []? Met: []? Not Met: []? Adjusted     Long Term Goals: To be achieved in: 8 weeks  1. Disability index score of 30% or less for the [facility] to assist with reaching prior level of function. []? Progressing: []? Met: []? Not Met: []? Adjusted  2. Patient will demonstrate increased AROM to 150 flex, 90 ER, and 70 IR to allow for proper joint functioning as indicated by patients Functional Deficits. [x]? Progressing: []? Met: []? Not Met: []? Adjusted  3. Patient will demonstrate an increase in Strength to 4+/5 throughout the right UE to allow for proper functional mobility as indicated by patients Functional Deficits. []? Progressing: []? Met: []? Not Met: []? Adjusted  4. Patient will return to dressing, driving, combing hair with right hand without increased symptoms or restriction. [x]? Progressing: []? Met: []? Not Met: []? Adjusted  5. Participate in [employer] for return to sport. []? Progressing: []? Met: []? Not Met: []? Adjusted          Progression Towards Functional goals:  [x] Patient is progressing as expected towards functional goals listed. [] Progression is slowed due to complexities listed. [] Progression has been slowed due to co-morbidities. [] Plan just implemented, too soon to assess goals progression  [] Other:     ASSESSMENT:  Pt does have clicking and tenderness at [facility] joint. Worked on thoracic mobility. Overall Progression Towards Functional goals/ Treatment Progress Update:  [x] Patient is progressing as expected towards functional goals listed. [] Progression is slowed due to complexities/Impairments listed. [] Progression has been slowed due to co-morbidities.   [] Plan just implemented, too soon to assess goals progression <30days   [] Goals require adjustment due to lack of progress  [] Patient is not progressing as expected and requires additional follow up with physician  [] Other    Prognosis for POC: [x] Good [] Fair  [] Poor      Patient requires continued skilled intervention: [x] Yes  [] No    Treatment/Activity Tolerance:  [x] Patient able to complete treatment  [] Patient limited by fatigue  [] Patient limited by pain    [] Patient limited by other medical complications  [] Other:         PLAN: See eval  [x] Continue per plan of care [] Alter current plan (see comments above)  [] Plan of care initiated [] Hold pending MD visit [] Discharge      Electronically signed by:  Christophe Montoya PT    Note: If patient does not return for scheduled/ recommended follow up visits, this note will serve as a discharge from care along with most recent update on progress.

## 2020-12-31 ENCOUNTER — HOSPITAL ENCOUNTER (OUTPATIENT)
Dept: PHYSICAL THERAPY | Age: 55
Setting detail: THERAPIES SERIES
Discharge: HOME | End: 2020-12-31
Payer: COMMERCIAL

## 2020-12-31 PROCEDURE — 97140 MANUAL THERAPY 1/> REGIONS: CPT | Performed by: PHYSICAL THERAPIST

## 2020-12-31 NOTE — FLOWSHEET NOTE
[facility] [facility],  [address] [place]  Phone: [phone]  Fax [phone]        Date:  2020    Patient Name:  Ivette Crandall    :  1965  MRN: [id number]  Restrictions/Precautions:    Medical/Treatment Diagnosis Information:  · Diagnosis: S46.011D  traumatic RTC tear    s/p Double row supraspinatus repair, subscap repair, biceps tenodesis, labral debridement    DOS:  20  · Treatment Diagnosis: right shoulder pain Y74.623  Insurance/Certification information:  PT Insurance Information: BCBS- $2500 deductible met  0 copay, 20 PT   (used 10)  Needs auth  Physician Information:  Referring Practitioner: Dr. Christiano Mckeon  Has the plan of care been signed (Y/N):        []  Yes  [x]  No     Date of Patient follow up with Physician: 20      Is this a Progress Report:     []  Yes  [x]  No        If Yes:  Date Range for reporting period:  Beginnin20  Ending:     Progress report will be due (10 Rx or 30 days whichever is less):        Recertification will be due (POC Duration  / 90 days whichever is less):        Visit # Insurance Allowable Auth Required   29 10 /  New ins  X 20 []  Yes []  No         Therapy Diagnosis/Practice Pattern:I      Number of Comorbidities:  [x]0     []1-2    []3+    Latex Allergy:  [x]NO      []YES  Preferred Language for Healthcare:   [x]English       []other:    SUBJECTIVE:  Pt does not have difficulty with ADLs. Pt is sleeping well. Still unable to throw a ball. OBJECTIVE: See eval   Observation: shoulder hike still present.   Firm end feel with PROM   Test measurements:     ROM right Right   Shoulder Flex  150   Shoulder Abd     Shoulder ER 0 81   Shoulder IR  52   Elbow flex  WNL   Elbow ext  WNL   Strength  right Right   Shoulder Flex nt    Shoulder Scap nt    Shoulder ER nt 4+   Shoulder IR nt 5        Pain scale 5 0 /10   Quick dash 43=73  19= 18% self care, carrying, lifting, driving/computer work. Home Exercise Program:    [x] (53400) Reviewed/Progressed HEP activities related to strengthening, flexibility, endurance, ROM of scapular, scapulothoracic and UE control with self care, reaching, carrying, lifting, house/yardwork, driving/computer work  [] (61302) Reviewed/Progressed HEP activities related to improving balance, coordination, kinesthetic sense, posture, motor skill, proprioception of scapular, scapulothoracic and UE control with self care, reaching, carrying, lifting, house/yardwork, driving/computer work      Manual Treatments:  PROM / STM / Oscillations-Mobs:  G-I, II, III, IV (PA's, Inf., Post.)  [x] (01428) Provided manual therapy to mobilize soft tissue/joints of cervical/CT, scapular GHJ and UE for the purpose of modulating pain, promoting relaxation,  increasing ROM, reducing/eliminating soft tissue swelling/inflammation/restriction, improving soft tissue extensibility and allowing for proper ROM for normal function with self care, reaching, carrying, lifting, house/yardwork, driving/computer work    Modalities:      Charges:  Timed Code Treatment Minutes: 20   Total Treatment Minutes: 20       [] EVAL (LOW) 98681 (typically 20 minutes face-to-face)  [] EVAL (MOD) 59704 (typically 30 minutes face-to-face)  [] EVAL (HIGH) 31155 (typically 45 minutes face-to-face)  [] RE-EVAL     [] AB(53317) x     [] IONTO  [] NMR (62832) x     [] VASO  [x] Manual (72568) x   1   [] Other: ice  [] TA x      [] Mech Traction (05729)  [] ES(attended) (80298)      [] ES (un) (32527):     GOALS:  Patient stated goal: return to full function  []? Progressing: []? Met: []? Not Met: []? Adjusted     Therapist goals for Patient:   Short Term Goals: To be achieved in: 2 weeks  1. Independent in HEP and progression per patient tolerance, in order to prevent re-injury. []? Progressing: []? Met: []? Not Met: []? Adjusted  2.  Patient will have a decrease in pain to facilitate improvement in movement, function, and ADLs as indicated by Functional Deficits. []? Progressing: []? Met: []? Not Met: []? Adjusted     Long Term Goals: To be achieved in: 8 weeks  1. Disability index score of 30% or less for the [facility] to assist with reaching prior level of function. []? Progressing: []? Met: []? Not Met: []? Adjusted  2. Patient will demonstrate increased AROM to 150 flex, 90 ER, and 70 IR to allow for proper joint functioning as indicated by patients Functional Deficits. [x]? Progressing: []? Met: []? Not Met: []? Adjusted  3. Patient will demonstrate an increase in Strength to 4+/5 throughout the right UE to allow for proper functional mobility as indicated by patients Functional Deficits. []? Progressing: []? Met: []? Not Met: []? Adjusted  4. Patient will return to dressing, driving, combing hair with right hand without increased symptoms or restriction. [x]? Progressing: []? Met: []? Not Met: []? Adjusted  5. Participate in [facility] for return to sport. []? Progressing: []? Met: []? Not Met: []? Adjusted          Progression Towards Functional goals:  [x] Patient is progressing as expected towards functional goals listed. [] Progression is slowed due to complexities listed. [] Progression has been slowed due to co-morbidities. [] Plan just implemented, too soon to assess goals progression  [] Other:     ASSESSMENT:  Recommend transitioning to GAP    Overall Progression Towards Functional goals/ Treatment Progress Update:  [x] Patient is progressing as expected towards functional goals listed. [] Progression is slowed due to complexities/Impairments listed. [] Progression has been slowed due to co-morbidities.   [] Plan just implemented, too soon to assess goals progression <30days   [] Goals require adjustment due to lack of progress  [] Patient is not progressing as expected and requires additional follow up with physician  [] Other    Prognosis for POC: [x] Good [] Fair  [] Poor      Patient requires continued skilled intervention: [x] Yes  [] No    Treatment/Activity Tolerance:  [x] Patient able to complete treatment  [] Patient limited by fatigue  [] Patient limited by pain    [] Patient limited by other medical complications  [] Other:         PLAN: See eval  [x] Continue per plan of care [] Alter current plan (see comments above)  [] Plan of care initiated [] Hold pending MD visit [] Discharge      Electronically signed by:  Shavonne Moe PT    Note: If patient does not return for scheduled/ recommended follow up visits, this note will serve as a discharge from care along with most recent update on progress.

## 2021-01-05 ENCOUNTER — HOSPITAL ENCOUNTER (OUTPATIENT)
Dept: PHYSICAL THERAPY | Age: 56
Discharge: HOME | End: 2021-01-05

## 2021-01-05 ENCOUNTER — OFFICE VISIT (OUTPATIENT)
Dept: ORTHOPEDIC SURGERY | Age: 56
End: 2021-01-05
Payer: COMMERCIAL

## 2021-01-05 VITALS — HEIGHT: 70 IN | BODY MASS INDEX: 29.78 KG/M2 | WEIGHT: 208 LBS

## 2021-01-05 DIAGNOSIS — S46.011A TRAUMATIC COMPLETE TEAR OF RIGHT ROTATOR CUFF, INITIAL ENCOUNTER: Primary | ICD-10-CM

## 2021-01-05 PROCEDURE — 9990000029 HC GAP PACKAGE

## 2021-01-05 PROCEDURE — 99213 OFFICE O/P EST LOW 20 MIN: CPT | Performed by: ORTHOPAEDIC SURGERY

## 2021-01-05 NOTE — PROGRESS NOTES
Surgery: Arthroscopic repair of a traumatic full-thickness anterior superior rotator cuff tear, large tear with extension into the upper border subscapularis and 2 and half centimeter supraspinatus. Included biceps tenodesis. Post-Op Week: Almost 5 months    Chief Complaint:  Follow-up (RIGHT SHOULDER)      History of Present of Illness: Kallie Rivero comes in today doing really well. He really has no pain. Just feels somewhat stiff. He has the feeling that his shoulder does not move right and he still does some scapular shrugging. He has no neurologic symptoms. He sleeping well. He is excited to move forward with his resistance exercise program.    Review of Systems  Pertinent items are noted in HPI  Denies fever, chills, confusion, bowel/bladder active change. Review of systems reviewed from Patient History Form dated on January 5 and available in the patient's chart under the Media tab. Examination:  On exam today he has excellent dextrorotation strength and quite good external rotation. He has good belly press. He has excellent biceps contour and resistance. Most notable finding is tightness of his inferior capsule. With scapular stabilized his forward elevation is really no better than 90 degrees. Radiology:     None today    No orders of the defined types were placed in this encounter. Impression:  My clinical impression is that Kallie Rivero has healed his traumatic large cuff tear quite nicely. Good strength improvements. Some persistent deficits in forward elevation and abduction due to inferior capsular tightness as a result of the traumatic injury, surgery and postoperative mobilization.       Treatment Plan: We had a nice visit today reviewing our plan. At this time he is released to all full strength training with no restrictions. That said, he is clearly limited above 90 degrees due to his inferior capsular tightness. I recommended that he continue to work on gentle pressing strength, deltoid strength, as well as his back and external rotation and biceps work. I think he needs to really dedicate himself to a multiple times a day of gentle axillary capsular stretching. We will see over the next 6 to 8 weeks how he progresses. He will call back anytime. [address] [facility] and Sports Medicine Surgery     This dictation was performed with a verbal recognition program (DRAGON) and it was checked for errors. It is possible that there are still dictated errors within this office note. If so, please bring any errors to my attention for an addendum. All efforts were made to ensure that this office note is accurate.

## 2021-01-20 ENCOUNTER — HOSPITAL ENCOUNTER (OUTPATIENT)
Dept: PHYSICAL THERAPY | Age: 56
Discharge: HOME | End: 2021-01-20

## 2021-01-20 NOTE — FLOWSHEET NOTE
The [address] [place] [address] and Sports Medicine, [address] [facility] PT    Upper Extremity Daily Performance Training Note  Date:  2021    Patient Name:  Dang Otto    :  1965  MRN: [id number]  Restrictions/Precautions: L ACL 2020 - R ACL Deficient - R Achilles Tear  Medical/Treatment Diagnosis Information:   ·  R RCR 2020 - Cycling Accident  ·  Golf - [address]    Physician Information:   Manolo uFentesstone - PRN    Visit Number:  paid 210 on 2021    Subjective: Pt states that overall he is doing better, and his shoulder is feeling better with the exercises performed. Objective:  Observation:   AROM Flex 120, Abd 120, IR/ER WFL, Fx Ir 4\" decreased R vs L  MMT 4+/5 globally    Exercises:  Exercise/Equipment  2021   UBE 3'   Strap Stretch 3'   Prayer Stretch 3'   Corner Pec Stretch 3'   BodyBlade IR/ER, Abd/add 2x20\" each   Table Push-up x10       TB ER Lilliana Birkenhead x20   TB IR Pelaez x 20   TB Punch 2D Pelaez x10 each   TB Horiz Add Blue 3x10   TB Horiz Abd Green 3x10       CC LPD 6 pl x10 VTC   CC Rows 6 pl x10 VTC       Cage Flexion - OP, ABD-OP x10 x5 each               Leg Press Bilat, Ecc R/L (6 hole showing) 120#, 100# 3x10                         Other Therapeutic Activities:     Home Exercise Program: Stretches as noted above, continue with PT exercises    Patient Education:    Reviewed shoulder mechanics and his surgery. Discussed good vs bad sore and gradual progressions. Pt voiced good understanding.     Assessment:  [x] Patient tolerated treatment well [] Patient limited by fatigue  [] Patient limited by pain  [] Patient limited by other medical complications  [] Other:     Prognosis: [] Good [x] Fair  [] Poor    Patient Requires Follow-up: [x] Yes  [] No    Plan:   [] Continue per plan of care [] Alter current plan (see comments)  [x] Plan of care initiated [] Hold pending MD visit [] Discharge    Electronically signed by: SHERITA Patel, ATC  Helen Lozano, ATS     Tx was performed by ATC as a part of the [facility] program following PT's recommendations/guidance.        Cosigned by:

## 2021-01-27 ENCOUNTER — HOSPITAL ENCOUNTER (OUTPATIENT)
Dept: PHYSICAL THERAPY | Age: 56
Discharge: HOME | End: 2021-01-27

## 2021-01-27 NOTE — FLOWSHEET NOTE
The [facility] and Sports Medicine, [address] [facility] PT    Upper Extremity Daily Performance Training Note  Date:  2021    Patient Name:  Zakiya Wiggins    :  1965  MRN: [id number]  Restrictions/Precautions: L ACL 2020 - R ACL Deficient - R Achilles Tear  Medical/Treatment Diagnosis Information:   ·  R RCR Massive - 2020 - Cycling Accident  ·  Golf - [address]    Physician Information:   Evan Gonzáles GABY    Visit Number: 3/7 paid 210 on 2021    Subjective: Pt states that overall he is doing good and has no problems. Objective:  Observation:   AROM Flex 120, Abd 120, IR/ER WFL, Fx Ir 4\" decreased R vs L  MMT 4+/5 globally    Exercises:  Exercise/Equipment  2021   UBE 3'   Strap Stretch 3'   Prayer Stretch 3'   Corner Pec Stretch 3'   BodyBlade IR/ER, Abd/add 2x20\" each   Table Push-up 2x10       TB ER Gilford Riley 2x15   TB IR Pelaez 2x15   TB Punch 2D Gray 2x10 each   TB Horiz Add Blue 3x10   TB Horiz Abd Blue 3x10       CC LPD 6 pl x10 VTC   CC Rows 6 pl x10 VTC       Cage Flexion - OP, Scaption-OP, ER-OP x10 x5 each               Leg Press Bilat, Ecc R/L (6 hole showing) 120#, 100# 3x10               CP  10'         Other Therapeutic Activities:     Home Exercise Program: Stretches as noted above, continue with PT exercises    Patient Education:    Reviewed shoulder mechanics and his surgery. Discussed good vs bad sore and gradual progressions. Pt voiced good understanding.     Assessment:  [x] Patient tolerated treatment well [] Patient limited by fatigue  [] Patient limited by pain  [] Patient limited by other medical complications  [] Other:     Prognosis: [] Good [x] Fair  [] Poor    Patient Requires Follow-up: [x] Yes  [] No    Plan:   [] Continue per plan of care [] Alter current plan (see comments)  [x] Plan of care initiated [] Hold pending MD visit [] Discharge    Electronically signed by:  Nora Sloan LAT, INDER Osorio     Tx was performed by ATC as a part of the [facility] program following PT's recommendations/guidance.        Cosigned by:

## 2021-02-03 ENCOUNTER — HOSPITAL ENCOUNTER (OUTPATIENT)
Dept: PHYSICAL THERAPY | Age: 56
Discharge: HOME | End: 2021-02-03

## 2021-02-03 NOTE — FLOWSHEET NOTE
The [address] [place] [address] and Sports Medicine, [address] [facility] PT    Upper Extremity Daily Performance Training Note  Date:  2/3/2021    Patient Name:  Anahi Conte    :  1965  MRN: [id number]  Restrictions/Precautions: L ACL 2020 - R ACL Deficient - R Achilles Tear  Medical/Treatment Diagnosis Information:   ·  R RCR Massive 2020 - Cycling Accident  ·  Golf - [address]    Physician Information:   Irma GRIFFIN    Visit Number:  paid 210 on 2021    Subjective: Pt states that overall he is doing, but reports pain on the top of his shoulder. Pt reports that he thinks he slept on it wrong. Objective:  Observation:   AROM Flex 120, Abd 120, IR/ER WFL, Fx Ir 4\" decreased R vs L  MMT 4+/5 globally    Exercises:  Exercise/Equipment  2-3-2021   UBE 3'   Strap Stretch 3'   Prayer Stretch 3'   Corner Pec Stretch 3'   BodyBlade IR/ER, Abd/add 2x20\" each   Table Push-up 2x10       TB ER Trevor [place] 2x15   TB IR Trevor [place] 2x15   TB Punch 2D Trevor [place] 2x15 each   TB Horiz Add Blue 2x15   TB Horiz Abd Blue 2x15       CC LPD 6 pl 2x10 VTC   CC Rows 6 pl 2x10 VTC       Cage Flexion - OP, Scaption-OP, ER-OP x10 x5 each               Leg Press Bilat, Ecc R/L (6 hole showing) 120#, 120# 3x10               CP  10'         Other Therapeutic Activities:     Home Exercise Program: Stretches as noted above, continue with PT exercises    Patient Education:    Reviewed shoulder mechanics and his surgery. Discussed good vs bad sore and gradual progressions. Pt voiced good understanding.     Assessment:  [x] Patient tolerated treatment well [] Patient limited by fatigue  [] Patient limited by pain  [] Patient limited by other medical complications  [] Other:     Prognosis: [] Good [x] Fair  [] Poor    Patient Requires Follow-up: [x] Yes  [] No    Plan:   [] Continue per plan of care [] Alter current plan (see comments)  [x] Plan of care initiated [] Hold pending MD visit [] Discharge    Electronically signed by:  SHERITA Canales, INDER Osorio     Tx was performed by ATC as a part of the [facility] program following PT's recommendations/guidance.        Cosigned by:

## 2021-02-17 ENCOUNTER — HOSPITAL ENCOUNTER (OUTPATIENT)
Dept: PHYSICAL THERAPY | Age: 56
Discharge: HOME | End: 2021-02-17

## 2021-02-17 NOTE — FLOWSHEET NOTE
The [address] [place] [address] and Sports Medicine, 1900 [facility] PT    Upper Extremity Daily Performance Training Note  Date:  2021    Patient Name:  Hayley Cowan    :  1965  MRN: [id number]  Restrictions/Precautions: L ACL 2020 - R ACL Deficient - R Achilles Tear  Medical/Treatment Diagnosis Information:   ·  R RCR 2020 - Cycling Accident  ·  Golf - [address]    Physician Information:   Enrico GRIFFIN    Visit Number:  paid 210 on 2021    Subjective: Pt states that overall he is doing well. He was walking the dog and the dog pulled the leash with his involved arm and had some soreness, but reports it feeling a lot better today. Objective:  Observation:   AROM Flex 120, Abd 120, IR/ER WFL, Fx Ir 4\" decreased R vs L  MMT 4+/5 globally    Exercises:  Exercise/Equipment  2021   UBE 3'   Strap Stretch 3'   Prayer Stretch 3'   Corner Pec Stretch 3'   BodyBlade IR/ER, Abd/add 2x20\" each   Table Push-up 2x10       TB ER Zhou Jaylen 2x15   TB IR Zhou Jaylen 2x15   TB Punch 2D Zhou Jaylen 2x15 each   TB Horiz Add Blue 2x15   TB Horiz Abd Blue 2x15       CC LPD 6 pl 2x10 VTC   CC Rows 6 pl 2x10 VTC       Cage Flexion - OP, Scaption-OP, ER-OP x10 x5 each               Leg Press Bilat, Ecc R/L (6 hole showing) 120#, 120# 3x10               CP  10'         Other Therapeutic Activities:     Home Exercise Program: Stretches as noted above, continue with PT exercises    Patient Education:    Reviewed shoulder mechanics and his surgery. Discussed good vs bad sore and gradual progressions. Pt voiced good understanding.     Assessment:  [x] Patient tolerated treatment well [] Patient limited by fatigue  [] Patient limited by pain  [] Patient limited by other medical complications  [] Other:     Prognosis: [] Good [x] Fair  [] Poor    Patient Requires Follow-up: [x] Yes  [] No    Plan:   [] Continue per plan of care [] Alter current plan (see comments)  [x] Plan of care initiated [] Hold pending MD visit [] Discharge    Electronically signed by:  SHERITA Artis ATC Mollie Borcherding, ATS     Tx was performed by ATC as a part of the [facility] program following PT's recommendations/guidance.        Cosigned by:

## 2021-02-24 ENCOUNTER — HOSPITAL ENCOUNTER (OUTPATIENT)
Dept: PHYSICAL THERAPY | Age: 56
Discharge: HOME | End: 2021-02-24

## 2021-02-24 NOTE — FLOWSHEET NOTE
The [address] [facility] and Sports Medicine, 1900 [facility] PT    Upper Extremity Daily Performance Training Note  Date:  2021    Patient Name:  Anahi Conte  \"Chacorta\"  :  1965  MRN: [id number]  Restrictions/Precautions: L ACL 2020 - R ACL Deficient - R Achilles Tear  Medical/Treatment Diagnosis Information:   ·  R RCR 2020 - Cycling Accident  ·  Golf - [address]    Physician Information:   Irma GRIFFIN    Visit Number:  paid 210 on 2021    Subjective: Pt states that overall he is doing well. He was walking the dog and the dog pulled the leash with his involved arm and had some soreness, but reports it feeling a lot better today. Objective:  Observation:   AROM Flex 120, Abd 120, IR/ER WFL, Fx Ir 4\" decreased R vs L  MMT 4+/5 globally    Exercises:  Exercise/Equipment     UBE 3'   Strap Stretch 3'   Prayer Stretch 3'   Corner Pec Stretch 3'   BodyBlade IR/ER, Abd/add 2x25\" each   Table Push-up 2x10       TB ER Trevor Ute 2x15   TB IR Trevor Ute 2x15   TB Punch 2D Trevor Ute 2x15 each   TB Horiz Add Blue 2x15   TB Horiz Abd Blue 2x15       CC LPD 6 pl 2x10 VTC   CC Rows 6 pl 2x10 VTC       Cage Flexion - OP, Scaption-OP, ER-OP x10 x5 each               Leg Press Bilat, Ecc R/L (6 hole showing) 120#, 120# 3x10       Reviewed UE Gym Exercises        CP  10'         Other Therapeutic Activities:     Home Exercise Program: Stretches as noted above, continue with PT exercises    Patient Education:    Reviewed shoulder mechanics and his surgery. Discussed good vs bad sore and gradual progressions. Pt voiced good understanding.     Assessment:  [x] Patient tolerated treatment well [] Patient limited by fatigue  [] Patient limited by pain  [] Patient limited by other medical complications  [] Other:     Prognosis: [] Good [x] Fair  [] Poor    Patient Requires Follow-up: [x] Yes  [] No    Plan:   [] Continue per plan of care [] Pavan Keen current plan (see comments)  [x] Plan of care initiated [] Hold pending MD visit [] Discharge    Electronically signed by:  SHERITA Tan, INDER Osorio     Tx was performed by ATC as a part of the [facility] program following PT's recommendations/guidance.        Cosigned by:

## 2021-03-03 ENCOUNTER — HOSPITAL ENCOUNTER (OUTPATIENT)
Dept: PHYSICAL THERAPY | Age: 56
Discharge: HOME | End: 2021-03-03

## 2021-03-03 NOTE — FLOWSHEET NOTE
The [address] [facility] and Sports Medicine, 1900 [facility][1] PT    Upper Extremity Daily Performance Training Note  Date:  3/3/2021    Patient Name:  Kailey Holly  \"Chacorta\"  :  1965  MRN: [id number]  Restrictions/Precautions: L ACL Allo 2020 - R ACL BTB - R Achilles Tear  Medical/Treatment Diagnosis Information:   ·  R RCR Massive - 2020 - Cycling Accident  ·  Golf - [address]    Physician Information:   Ramonitasaudbrown GRIFFIN    Visit Number:  paid 210 on 2021    Subjective: Pt states that overall he is doing well. Has an appointment today with his  who I will contact to discuss his care. Objective:  Observation:   AROM Flex 120, Abd 120, IR/ER WFL, Fx Ir 4\" decreased R vs L  MMT 4+/5 globally    Exercises:  Exercise/Equipment  3-3-2021   UBE 3'   Strap Stretch 3'   Prayer Stretch 3'   Corner Pec Stretch 3'   BodyBlade IR/ER, Abd/add 2x25\" each   Table Push-up 2x15   MB Chest Pass 3D 6# x15 each   MB OH Pass 2D 4# x15 each   TB ER Gray 2x15   TB IR Gray 2x15   TB Punch 2D Ginger Cooler 2x15 each   TB Horiz Add Blue 2x15   TB Horiz Abd Blue 2x15       CC LPD 6 pl 2x10 VTC   CC Rows 6 pl 2x10 VTC       Cage Flexion - OP, Scaption-OP, ER-OP x10 x5 each               Leg Press Bilat, Ecc R/L (6 hole showing) 120#, 120# 3x10       Reviewed UE Gym Exercises        CP  10'         Other Therapeutic Activities:     Home Exercise Program: Stretches as noted above, continue with PT exercises    Patient Education:    Reviewed shoulder mechanics and his surgery. Discussed good vs bad sore and gradual progressions. Pt voiced good understanding.     Assessment:  [x] Patient tolerated treatment well [] Patient limited by fatigue  [] Patient limited by pain  [] Patient limited by other medical complications  [] Other:     Prognosis: [] Good [x] Fair  [] Poor    Patient Requires Follow-up: [x] Yes  [] No    Plan:   [] Continue per plan of care [] Alter current plan (see comments)  [x] Plan of care initiated [] Hold pending MD visit [] Discharge    Electronically signed by:  SHERITA Singh, ATC  INDER Grande     Tx was performed by ATC as a part of the [facility] program following PT's recommendations/guidance.        Cosigned by:

## 2023-09-15 NOTE — FLOWSHEET NOTE
[place] [address] and Rehabilitation,  [address] [facility]  Phone: [phone]  Fax [phone]        Date:  2020    Patient Name:  Josee Larry    :  1965  MRN: [id number]  Restrictions/Precautions:    Medical/Treatment Diagnosis Information:  · Diagnosis: S46.011D  traumatic RTC tear    s/p Double row supraspinatus repair, subscap repair, biceps tenodesis, labral debridement    DOS:  20  · Treatment Diagnosis: right shoulder pain J99.553  Insurance/Certification information:  PT Insurance Information: BCBS- $2500 deductible met  0 copay, 20 PT   (used 10)  Needs auth  Physician Information:  Referring Practitioner: Dr. Alivia Louie  Has the plan of care been signed (Y/N):        []  Yes  [x]  No     Date of Patient follow up with Physician: 20      Is this a Progress Report:     []  Yes  [x]  No        If Yes:  Date Range for reporting period:  Beginnin20  Ending:     Progress report will be due (10 Rx or 30 days whichever is less):        Recertification will be due (POC Duration  / 90 days whichever is less):        Visit # Insurance Allowable Auth Required   14 10 /  New ins  X 20 []  Yes []  No        Therapy Diagnosis/Practice Pattern:I      Number of Comorbidities:  [x]0     []1-2    []3+    Latex Allergy:  [x]NO      []YES  Preferred Language for Healthcare:   [x]English       []other:    SUBJECTIVE: Pt feels like he is plateauing. OBJECTIVE: See eval   Observation:    Test measurements:    ROM Left Right   Shoulder Flex  140   Shoulder Abd     Shoulder ER  45   Shoulder IR     Elbow flex  WNL   Elbow ext  WNL   Strength  Left Right   Shoulder Flex  nt   Shoulder Scap  nt   Shoulder ER  nt   Shoulder IR  nt        Pain scale  2  /10   Quick dash   43 = 73%     RESTRICTIONS/PRECAUTIONS: history of bilat ACLs, achilles tendon repair.     Priscilla:  20  Over the next a month were going to focus on some We will start you on lupron and Xtandi. Please have your bone density exam done. Call clinic for chemo teaching once you get Xtandi pills. - obtain genetic testing and liquid biopsy results from [facility]. gentle progression with his glenohumeral stretching. Beginning active assisted to independent engagement of his rotator cuff. No significant resistance or quick dynamic movements. Exercises/Interventions:      Therapeutic Ex (58951) Sets/sec Reps Notes/CUES         Post cap self MA with tennis ball  2 min    Sleeper s :15 5x    Supine pec s :20  5x           Cane flex/  Cane ER  x10  With bridge     Supine ER LLLD  3x  ;20      Supine post cap s :20  3x     Serratus punch 5# 2 x 10  hep   SL ERC 3# 2 x 10        SL IR s :10  5x           Modified IR s :10 X 10    pulleys  X 20     Wall walks  X 10     TB  IR/ ER GR X 20     TB row/  TB ext GR x 20 GR x 20    True S  flex  X 10           CC row/    CC triceps 50# x 30 40# x 30     CC LPD  60# x 30            Manual Intervention (32983)      Joint mobs  Inf and post  4 min    sidelying scap mobs  5 min    PROM of shoulder. 10 min    Prone post cap STM  5 min                NMR re-education (57385)   CUES NEEDED                                   Therapeutic Exercise and NMR EXR  [x] (30886) Provided verbal/tactile cueing for activities related to strengthening, flexibility, endurance, ROM  for improvements in scapular, scapulothoracic and UE control with self care, reaching, carrying, lifting, house/yardwork, driving/computer work.    [] (29725) Provided verbal/tactile cueing for activities related to improving balance, coordination, kinesthetic sense, posture, motor skill, proprioception  to assist with  scapular, scapulothoracic and UE control with self care, reaching, carrying, lifting, house/yardwork, driving/computer work.     Therapeutic Activities:    [] (23024 or 07552) Provided verbal/tactile cueing for activities related to improving balance, coordination, kinesthetic sense, posture, motor skill, proprioception and motor activation to allow for proper function of scapular, scapulothoracic and UE control with self care, carrying, lifting, driving/computer work. Home Exercise Program:    [x] (51091) Reviewed/Progressed HEP activities related to strengthening, flexibility, endurance, ROM of scapular, scapulothoracic and UE control with self care, reaching, carrying, lifting, house/yardwork, driving/computer work  [] (06624) Reviewed/Progressed HEP activities related to improving balance, coordination, kinesthetic sense, posture, motor skill, proprioception of scapular, scapulothoracic and UE control with self care, reaching, carrying, lifting, house/yardwork, driving/computer work      Manual Treatments:  PROM / STM / Oscillations-Mobs:  G-I, II, III, IV (PA's, Inf., Post.)  [x] (27662) Provided manual therapy to mobilize soft tissue/joints of cervical/CT, scapular GHJ and UE for the purpose of modulating pain, promoting relaxation,  increasing ROM, reducing/eliminating soft tissue swelling/inflammation/restriction, improving soft tissue extensibility and allowing for proper ROM for normal function with self care, reaching, carrying, lifting, house/yardwork, driving/computer work    Modalities:      Charges:  Timed Code Treatment Minutes: 30   Total Treatment Minutes: 40       [] EVAL (LOW) 64702 (typically 20 minutes face-to-face)  [] EVAL (MOD) 73987 (typically 30 minutes face-to-face)  [] EVAL (HIGH) 08316 (typically 45 minutes face-to-face)  [] RE-EVAL     [x] VD(76198) x   1  [] IONTO  [] NMR (82023) x     [] VASO  [x] Manual (55653) x   1   [x] Other: ice  [] TA x      [] Mech Traction (80952)  [] ES(attended) (25771)      [] ES (un) (65248):     GOALS:  Patient stated goal: return to full function  []? Progressing: []? Met: []? Not Met: []? Adjusted     Therapist goals for Patient:   Short Term Goals: To be achieved in: 2 weeks  1. Independent in HEP and progression per patient tolerance, in order to prevent re-injury. []? Progressing: []? Met: []? Not Met: []? Adjusted  2.  Patient will have a decrease in pain to facilitate improvement in movement, function, and ADLs as indicated by Functional Deficits. []? Progressing: []? Met: []? Not Met: []? Adjusted     Long Term Goals: To be achieved in: 8 weeks  1. Disability index score of 30% or less for the [place] [employer] to assist with reaching prior level of function. []? Progressing: []? Met: []? Not Met: []? Adjusted  2. Patient will demonstrate increased AROM to 150 flex, 90 ER, and 70 IR to allow for proper joint functioning as indicated by patients Functional Deficits. [x]? Progressing: []? Met: []? Not Met: []? Adjusted  3. Patient will demonstrate an increase in Strength to 4+/5 throughout the right UE to allow for proper functional mobility as indicated by patients Functional Deficits. []? Progressing: []? Met: []? Not Met: []? Adjusted  4. Patient will return to dressing, driving, combing hair with right hand without increased symptoms or restriction. [x]? Progressing: []? Met: []? Not Met: []? Adjusted  5. Participate in [facility] for return to sport. []? Progressing: []? Met: []? Not Met: []? Adjusted          Progression Towards Functional goals:  [x] Patient is progressing as expected towards functional goals listed. [] Progression is slowed due to complexities listed. [] Progression has been slowed due to co-morbidities. [] Plan just implemented, too soon to assess goals progression  [] Other:     ASSESSMENT:   Pt feels crepitus under scapula. Add thoracic mobilization NV. Overall Progression Towards Functional goals/ Treatment Progress Update:  [x] Patient is progressing as expected towards functional goals listed. [] Progression is slowed due to complexities/Impairments listed. [] Progression has been slowed due to co-morbidities.   [] Plan just implemented, too soon to assess goals progression <30days   [] Goals require adjustment due to lack of progress  [] Patient is not progressing as expected and requires additional follow up with physician  [] Other    Prognosis for

## (undated) DEVICE — CANNULA ARTHSCP L3CM ID8MM DBL DAM 1 PC MOLD LO PROF FLNG

## (undated) DEVICE — PACK PROCEDURE SURG ARTHSCP CUST

## (undated) DEVICE — SOLUTION IRRIG 5L LAC R BG

## (undated) DEVICE — NEEDLE SUT PASS FOR ROT CUF LABRAL REP MULTFI SCORPION

## (undated) DEVICE — SOLUTION IV IRRIG 500ML 0.9% SODIUM CHL 2F7123

## (undated) DEVICE — AMBIENT SUPER TURBOVAC 90: Brand: COBLATION

## (undated) DEVICE — GLOVE SURG SZ 8 L12IN FNGR THK94MIL STD WHT LTX FREE

## (undated) DEVICE — CANNULA SMOOTH FLEX 8.0 X 72MM: Brand: CLEAR-TRAC

## (undated) DEVICE — TUBE IRRIG L8IN LNG PT W/ CONN FOR PMP SYS REDEUCE

## (undated) DEVICE — CATHETER IV 20GA L1.25IN PNK FEP SFTY STR HUB RADPQ DISP

## (undated) DEVICE — TUBING PMP L8FT LNG W/ CONN FOR AR-6400 REDEUCE

## (undated) DEVICE — GLOVE SURG SZ 65 L12IN FNGR THK94MIL STD WHT LTX FREE

## (undated) DEVICE — SHOULDER STABILIZATION KIT,                                    DISPOSABLE 12 PER BOX

## (undated) DEVICE — TOWEL,OR,DSP,ST,BLUE,STD,4/PK,20PK/CS: Brand: MEDLINE

## (undated) DEVICE — ABDOMINAL PAD: Brand: DERMACEA

## (undated) DEVICE — SUTURE FIBERTAPE FIBERWIRE SZ 2-0 30IN NONABSORB BLU AR72377

## (undated) DEVICE — GLOVE SURG SZ 75 L12IN FNGR THK94MIL STD WHT LTX FREE

## (undated) DEVICE — 40763 BEACH CHAIR HEAD RESTRAINT: Brand: 40763 BEACH CHAIR HEAD RESTRAINT

## (undated) DEVICE — GLOVE,SURG,SENSICARE,ALOE,LF,PF,7: Brand: MEDLINE

## (undated) DEVICE — 3M™ TEGADERM™ TRANSPARENT FILM DRESSING FRAME STYLE, 1624W, 2-3/8 IN X 2-3/4 IN (6 CM X 7 CM), 100/CT 4CT/CASE: Brand: 3M™ TEGADERM™

## (undated) DEVICE — SOLUTION IV 1000ML LAC RINGERS PH 6.5 INJ USP VIAFLX PLAS

## (undated) DEVICE — DRESSING,GAUZE,XEROFORM,CURAD,1"X8",ST: Brand: CURAD

## (undated) DEVICE — MEDI-VAC NON-CONDUCTIVE SUCTION TUBING: Brand: CARDINAL HEALTH

## (undated) DEVICE — SUTURE NONABSORBABLE MONOFILAMENT 3-0 PS-1 18 IN BLK ETHILON 1663H

## (undated) DEVICE — SUTURE VCRL SZ 3-0 L27IN ABSRB UD L26MM SH 1/2 CIR J416H

## (undated) DEVICE — 1010 S-DRAPE TOWEL DRAPE 10/BX: Brand: STERI-DRAPE™

## (undated) DEVICE — SKIN MARKER,REGULAR TIP WITH RULER AND LABELS: Brand: DEVON

## (undated) DEVICE — SET ADMIN PRIMING 7ML L30IN 7.35LB 20 GTT 2ND RLER CLMP

## (undated) DEVICE — SET GRAV VENT NVENT CK VLV 3 NDL FREE PRT 10 GTT

## (undated) DEVICE — TIBURON BEACH CHAIR SHOULDER DRAPE: Brand: CONVERTORS

## (undated) DEVICE — DYONICS 5.5 MM BONECUTTER PLATINUM                                    BLADE, ORANGE, 10000 MAXIMUM RPM,                                    PACKAGED 6 PER BOX, STERILE

## (undated) DEVICE — GOWN,SIRUS,NON REINFRCD,LARGE,SET IN SL: Brand: MEDLINE

## (undated) DEVICE — OCCLUSIVE GAUZE STRIP,3% BISMUTH TRIBROMOPHENATE IN PETROLATUM BLEND: Brand: XEROFORM

## (undated) DEVICE — 3M™ STERI-DRAPE™ U-DRAPE, LONG 1019: Brand: STERI-DRAPE™

## (undated) DEVICE — DRAPE,U/ SHT,SPLIT,PLAS,STERIL: Brand: MEDLINE